# Patient Record
Sex: MALE | Race: WHITE | NOT HISPANIC OR LATINO
[De-identification: names, ages, dates, MRNs, and addresses within clinical notes are randomized per-mention and may not be internally consistent; named-entity substitution may affect disease eponyms.]

---

## 2017-10-11 PROBLEM — Z00.00 ENCOUNTER FOR PREVENTIVE HEALTH EXAMINATION: Status: ACTIVE | Noted: 2017-10-11

## 2017-10-23 ENCOUNTER — APPOINTMENT (OUTPATIENT)
Dept: ORTHOPEDIC SURGERY | Facility: CLINIC | Age: 62
End: 2017-10-23
Payer: COMMERCIAL

## 2017-10-23 VITALS
HEIGHT: 72 IN | WEIGHT: 220 LBS | DIASTOLIC BLOOD PRESSURE: 76 MMHG | BODY MASS INDEX: 29.8 KG/M2 | SYSTOLIC BLOOD PRESSURE: 136 MMHG

## 2017-10-23 DIAGNOSIS — M25.462 EFFUSION, LEFT KNEE: ICD-10-CM

## 2017-10-23 DIAGNOSIS — M25.461 EFFUSION, RIGHT KNEE: ICD-10-CM

## 2017-10-23 DIAGNOSIS — Z78.9 OTHER SPECIFIED HEALTH STATUS: ICD-10-CM

## 2017-10-23 DIAGNOSIS — Z96.659 OTHER MECHANICAL COMPLICATION OF OTHER INTERNAL JOINT PROSTHESIS, INITIAL ENCOUNTER: ICD-10-CM

## 2017-10-23 DIAGNOSIS — Z86.79 PERSONAL HISTORY OF OTHER DISEASES OF THE CIRCULATORY SYSTEM: ICD-10-CM

## 2017-10-23 DIAGNOSIS — M17.10 UNILATERAL PRIMARY OSTEOARTHRITIS, UNSPECIFIED KNEE: ICD-10-CM

## 2017-10-23 DIAGNOSIS — T84.098A OTHER MECHANICAL COMPLICATION OF OTHER INTERNAL JOINT PROSTHESIS, INITIAL ENCOUNTER: ICD-10-CM

## 2017-10-23 PROCEDURE — 20610 DRAIN/INJ JOINT/BURSA W/O US: CPT | Mod: 50

## 2017-10-23 PROCEDURE — 99204 OFFICE O/P NEW MOD 45 MIN: CPT | Mod: 25

## 2017-10-23 PROCEDURE — 73562 X-RAY EXAM OF KNEE 3: CPT | Mod: 50

## 2017-10-23 RX ORDER — LOSARTAN POTASSIUM 50 MG/1
50 TABLET, FILM COATED ORAL
Refills: 0 | Status: ACTIVE | COMMUNITY

## 2017-11-10 ENCOUNTER — RX RENEWAL (OUTPATIENT)
Age: 62
End: 2017-11-10

## 2017-11-20 ENCOUNTER — OUTPATIENT (OUTPATIENT)
Dept: OUTPATIENT SERVICES | Facility: HOSPITAL | Age: 62
LOS: 1 days | End: 2017-11-20
Payer: COMMERCIAL

## 2017-11-20 ENCOUNTER — APPOINTMENT (OUTPATIENT)
Dept: SURGERY | Facility: CLINIC | Age: 62
End: 2017-11-20

## 2017-11-20 VITALS
RESPIRATION RATE: 16 BRPM | DIASTOLIC BLOOD PRESSURE: 97 MMHG | HEIGHT: 72 IN | HEART RATE: 70 BPM | TEMPERATURE: 99 F | SYSTOLIC BLOOD PRESSURE: 153 MMHG | WEIGHT: 227.08 LBS

## 2017-11-20 DIAGNOSIS — T84.032A MECHANICAL LOOSENING OF INTERNAL RIGHT KNEE PROSTHETIC JOINT, INITIAL ENCOUNTER: ICD-10-CM

## 2017-11-20 DIAGNOSIS — I10 ESSENTIAL (PRIMARY) HYPERTENSION: ICD-10-CM

## 2017-11-20 DIAGNOSIS — Z98.890 OTHER SPECIFIED POSTPROCEDURAL STATES: Chronic | ICD-10-CM

## 2017-11-20 DIAGNOSIS — Z01.818 ENCOUNTER FOR OTHER PREPROCEDURAL EXAMINATION: ICD-10-CM

## 2017-11-20 DIAGNOSIS — Z96.651 PRESENCE OF RIGHT ARTIFICIAL KNEE JOINT: Chronic | ICD-10-CM

## 2017-11-20 DIAGNOSIS — T84.098A OTHER MECHANICAL COMPLICATION OF OTHER INTERNAL JOINT PROSTHESIS, INITIAL ENCOUNTER: ICD-10-CM

## 2017-11-20 DIAGNOSIS — F41.1 GENERALIZED ANXIETY DISORDER: ICD-10-CM

## 2017-11-20 DIAGNOSIS — Z96.652 PRESENCE OF LEFT ARTIFICIAL KNEE JOINT: Chronic | ICD-10-CM

## 2017-11-20 LAB
ALBUMIN SERPL ELPH-MCNC: 4.1 G/DL — SIGNIFICANT CHANGE UP (ref 3.3–5)
ALP SERPL-CCNC: 76 U/L — SIGNIFICANT CHANGE UP (ref 40–120)
ALT FLD-CCNC: 24 U/L — SIGNIFICANT CHANGE UP (ref 10–45)
ANION GAP SERPL CALC-SCNC: 16 MMOL/L — SIGNIFICANT CHANGE UP (ref 5–17)
APPEARANCE UR: CLEAR — SIGNIFICANT CHANGE UP
APTT BLD: 33.1 SEC — SIGNIFICANT CHANGE UP (ref 27.5–37.4)
AST SERPL-CCNC: 19 U/L — SIGNIFICANT CHANGE UP (ref 10–40)
BILIRUB SERPL-MCNC: 0.5 MG/DL — SIGNIFICANT CHANGE UP (ref 0.2–1.2)
BILIRUB UR-MCNC: NEGATIVE — SIGNIFICANT CHANGE UP
BUN SERPL-MCNC: 24 MG/DL — HIGH (ref 7–23)
CALCIUM SERPL-MCNC: 9.4 MG/DL — SIGNIFICANT CHANGE UP (ref 8.4–10.5)
CHLORIDE SERPL-SCNC: 98 MMOL/L — SIGNIFICANT CHANGE UP (ref 96–108)
CO2 SERPL-SCNC: 23 MMOL/L — SIGNIFICANT CHANGE UP (ref 22–31)
COLOR SPEC: YELLOW — SIGNIFICANT CHANGE UP
CREAT SERPL-MCNC: 0.98 MG/DL — SIGNIFICANT CHANGE UP (ref 0.5–1.3)
DIFF PNL FLD: NEGATIVE — SIGNIFICANT CHANGE UP
GLUCOSE SERPL-MCNC: 111 MG/DL — HIGH (ref 70–99)
GLUCOSE UR QL: NEGATIVE — SIGNIFICANT CHANGE UP
HCT VFR BLD CALC: 41.1 % — SIGNIFICANT CHANGE UP (ref 39–50)
HGB BLD-MCNC: 14 G/DL — SIGNIFICANT CHANGE UP (ref 13–17)
INR BLD: 0.95 — SIGNIFICANT CHANGE UP (ref 0.88–1.16)
KETONES UR-MCNC: NEGATIVE — SIGNIFICANT CHANGE UP
LEUKOCYTE ESTERASE UR-ACNC: NEGATIVE — SIGNIFICANT CHANGE UP
MCHC RBC-ENTMCNC: 30.1 PG — SIGNIFICANT CHANGE UP (ref 27–34)
MCHC RBC-ENTMCNC: 34.1 G/DL — SIGNIFICANT CHANGE UP (ref 32–36)
MCV RBC AUTO: 88.4 FL — SIGNIFICANT CHANGE UP (ref 80–100)
MRSA PCR RESULT.: NEGATIVE — SIGNIFICANT CHANGE UP
NITRITE UR-MCNC: NEGATIVE — SIGNIFICANT CHANGE UP
PH UR: 6 — SIGNIFICANT CHANGE UP (ref 5–8)
PLATELET # BLD AUTO: 268 K/UL — SIGNIFICANT CHANGE UP (ref 150–400)
POTASSIUM SERPL-MCNC: 4 MMOL/L — SIGNIFICANT CHANGE UP (ref 3.5–5.3)
POTASSIUM SERPL-SCNC: 4 MMOL/L — SIGNIFICANT CHANGE UP (ref 3.5–5.3)
PROT SERPL-MCNC: 7.5 G/DL — SIGNIFICANT CHANGE UP (ref 6–8.3)
PROT UR-MCNC: NEGATIVE MG/DL — SIGNIFICANT CHANGE UP
PROTHROM AB SERPL-ACNC: 10.5 SEC — SIGNIFICANT CHANGE UP (ref 9.8–12.7)
RBC # BLD: 4.65 M/UL — SIGNIFICANT CHANGE UP (ref 4.2–5.8)
RBC # FLD: 13.7 % — SIGNIFICANT CHANGE UP (ref 10.3–16.9)
S AUREUS DNA NOSE QL NAA+PROBE: NEGATIVE — SIGNIFICANT CHANGE UP
SODIUM SERPL-SCNC: 137 MMOL/L — SIGNIFICANT CHANGE UP (ref 135–145)
SP GR SPEC: 1.02 — SIGNIFICANT CHANGE UP (ref 1–1.03)
UROBILINOGEN FLD QL: 0.2 E.U./DL — SIGNIFICANT CHANGE UP
WBC # BLD: 8.5 K/UL — SIGNIFICANT CHANGE UP (ref 3.8–10.5)
WBC # FLD AUTO: 8.5 K/UL — SIGNIFICANT CHANGE UP (ref 3.8–10.5)

## 2017-11-20 PROCEDURE — 71020: CPT | Mod: 26

## 2017-11-20 PROCEDURE — 93010 ELECTROCARDIOGRAM REPORT: CPT | Mod: NC

## 2017-11-20 NOTE — ASU PATIENT PROFILE, ADULT - PSH
H/O total knee replacement, left  2010  H/O total knee replacement, right  2009  History of surgery  left knee revision of total knee replacement (2013)  History of surgery  right knee total knee replacement revision (2015)  History of surgery  left leg fracture repair with hardware (2011)

## 2017-11-20 NOTE — H&P PST ADULT - HISTORY OF PRESENT ILLNESS
62 year old male with osteoarthritis right knee s/p right TKR 62 year old white male with osteoarthritis right knee s/p right TKR about 2013 and revision was done 2015.  About one year ago started to have moderate pain, deformity, decreased ROM and unsteady gait when started approaching normal activity.  Symptoms worse with stairs and after prolonged imobility and persist over time.   Left TKR also done about 2011 and revision 2 years later.  Sonogram of both legs less than two weeks ago negative for DVT although D dimer elevated.

## 2017-11-20 NOTE — H&P PST ADULT - FAMILY HISTORY
Father  Still living? No  Family history of hypertension in father, Age at diagnosis: Age Unknown  Family history of coronary artery disease in father, Age at diagnosis: Age Unknown  Family history of diabetes mellitus in father, Age at diagnosis: Age Unknown

## 2017-11-21 LAB
CULTURE RESULTS: SIGNIFICANT CHANGE UP
SPECIMEN SOURCE: SIGNIFICANT CHANGE UP

## 2017-12-03 RX ORDER — SENNA PLUS 8.6 MG/1
2 TABLET ORAL AT BEDTIME
Qty: 0 | Refills: 0 | Status: DISCONTINUED | OUTPATIENT
Start: 2017-12-05 | End: 2017-12-07

## 2017-12-03 RX ORDER — LOSARTAN POTASSIUM 100 MG/1
50 TABLET, FILM COATED ORAL DAILY
Qty: 0 | Refills: 0 | Status: DISCONTINUED | OUTPATIENT
Start: 2017-12-05 | End: 2017-12-07

## 2017-12-03 RX ORDER — OXYCODONE HYDROCHLORIDE 5 MG/1
5 TABLET ORAL EVERY 4 HOURS
Qty: 0 | Refills: 0 | Status: DISCONTINUED | OUTPATIENT
Start: 2017-12-05 | End: 2017-12-07

## 2017-12-03 RX ORDER — OXYCODONE HYDROCHLORIDE 5 MG/1
10 TABLET ORAL EVERY 4 HOURS
Qty: 0 | Refills: 0 | Status: DISCONTINUED | OUTPATIENT
Start: 2017-12-05 | End: 2017-12-07

## 2017-12-03 RX ORDER — POLYETHYLENE GLYCOL 3350 17 G/17G
17 POWDER, FOR SOLUTION ORAL DAILY
Qty: 0 | Refills: 0 | Status: DISCONTINUED | OUTPATIENT
Start: 2017-12-05 | End: 2017-12-07

## 2017-12-03 RX ORDER — HYDROMORPHONE HYDROCHLORIDE 2 MG/ML
0.5 INJECTION INTRAMUSCULAR; INTRAVENOUS; SUBCUTANEOUS EVERY 4 HOURS
Qty: 0 | Refills: 0 | Status: DISCONTINUED | OUTPATIENT
Start: 2017-12-05 | End: 2017-12-07

## 2017-12-03 RX ORDER — SODIUM CHLORIDE 9 MG/ML
1000 INJECTION, SOLUTION INTRAVENOUS
Qty: 0 | Refills: 0 | Status: DISCONTINUED | OUTPATIENT
Start: 2017-12-05 | End: 2017-12-07

## 2017-12-03 RX ORDER — ESCITALOPRAM OXALATE 10 MG/1
10 TABLET, FILM COATED ORAL DAILY
Qty: 0 | Refills: 0 | Status: DISCONTINUED | OUTPATIENT
Start: 2017-12-05 | End: 2017-12-07

## 2017-12-03 RX ORDER — DOCUSATE SODIUM 100 MG
100 CAPSULE ORAL THREE TIMES A DAY
Qty: 0 | Refills: 0 | Status: DISCONTINUED | OUTPATIENT
Start: 2017-12-05 | End: 2017-12-07

## 2017-12-03 RX ORDER — ACETAMINOPHEN 500 MG
650 TABLET ORAL EVERY 6 HOURS
Qty: 0 | Refills: 0 | Status: DISCONTINUED | OUTPATIENT
Start: 2017-12-05 | End: 2017-12-07

## 2017-12-03 RX ORDER — ENOXAPARIN SODIUM 100 MG/ML
40 INJECTION SUBCUTANEOUS EVERY 24 HOURS
Qty: 0 | Refills: 0 | Status: DISCONTINUED | OUTPATIENT
Start: 2017-12-06 | End: 2017-12-07

## 2017-12-03 RX ORDER — MAGNESIUM HYDROXIDE 400 MG/1
30 TABLET, CHEWABLE ORAL DAILY
Qty: 0 | Refills: 0 | Status: DISCONTINUED | OUTPATIENT
Start: 2017-12-05 | End: 2017-12-07

## 2017-12-03 RX ORDER — ONDANSETRON 8 MG/1
4 TABLET, FILM COATED ORAL EVERY 6 HOURS
Qty: 0 | Refills: 0 | Status: DISCONTINUED | OUTPATIENT
Start: 2017-12-05 | End: 2017-12-07

## 2017-12-03 NOTE — H&P ADULT - HISTORY OF PRESENT ILLNESS
62 y.o. M. with h/o HTN and failed Right TKR Revision don on 9/2015 by Dr. Mayer. Patient has pain, instabilty and swelling that are getting worse and is presenting at [facility] for his another Right TKR Revision with Dr. Davila.

## 2017-12-03 NOTE — H&P ADULT - REASON FOR ADMISSION
Right knee symptoms that are not getting better S/P Right TKR Revision done on 9/2015 by Dr. VANESSA Mayer.

## 2017-12-03 NOTE — H&P ADULT - NSHPPHYSICALEXAM_GEN_ALL_CORE
Right Knee healed midline incision, mild effusion, medial joint tenderness, ROM 0-120 degrees, increased AP translation and laxity.

## 2017-12-03 NOTE — H&P ADULT - NSHPLABSRESULTS_GEN_ALL_CORE
Right Knee x-ray shows Right TKR Revision with stem and medially augmented Tibia component and diffuse radio lucent lines, significant tibial bone loss under the wedge, radiolucent lines adjacent to anterior and posterior femoral component, radio lucency adjacent to the patella component suggestive of  possible loosening.

## 2017-12-04 ENCOUNTER — FORM ENCOUNTER (OUTPATIENT)
Age: 62
End: 2017-12-04

## 2017-12-05 ENCOUNTER — RESULT REVIEW (OUTPATIENT)
Age: 62
End: 2017-12-05

## 2017-12-05 ENCOUNTER — APPOINTMENT (OUTPATIENT)
Dept: ORTHOPEDIC SURGERY | Facility: HOSPITAL | Age: 62
End: 2017-12-05

## 2017-12-05 ENCOUNTER — INPATIENT (INPATIENT)
Facility: HOSPITAL | Age: 62
LOS: 1 days | Discharge: HOME | DRG: 468 | End: 2017-12-07
Attending: ORTHOPAEDIC SURGERY | Admitting: ORTHOPAEDIC SURGERY
Payer: COMMERCIAL

## 2017-12-05 ENCOUNTER — TRANSCRIPTION ENCOUNTER (OUTPATIENT)
Age: 62
End: 2017-12-05

## 2017-12-05 VITALS
HEIGHT: 72 IN | TEMPERATURE: 98 F | SYSTOLIC BLOOD PRESSURE: 162 MMHG | OXYGEN SATURATION: 96 % | DIASTOLIC BLOOD PRESSURE: 79 MMHG | WEIGHT: 227.96 LBS | HEART RATE: 62 BPM | RESPIRATION RATE: 20 BRPM

## 2017-12-05 DIAGNOSIS — F41.9 ANXIETY DISORDER, UNSPECIFIED: ICD-10-CM

## 2017-12-05 DIAGNOSIS — Z96.651 PRESENCE OF RIGHT ARTIFICIAL KNEE JOINT: ICD-10-CM

## 2017-12-05 DIAGNOSIS — Z98.890 OTHER SPECIFIED POSTPROCEDURAL STATES: Chronic | ICD-10-CM

## 2017-12-05 DIAGNOSIS — Z96.652 PRESENCE OF LEFT ARTIFICIAL KNEE JOINT: Chronic | ICD-10-CM

## 2017-12-05 DIAGNOSIS — I10 ESSENTIAL (PRIMARY) HYPERTENSION: ICD-10-CM

## 2017-12-05 DIAGNOSIS — Z96.651 PRESENCE OF RIGHT ARTIFICIAL KNEE JOINT: Chronic | ICD-10-CM

## 2017-12-05 LAB
ANION GAP SERPL CALC-SCNC: 11 MMOL/L — SIGNIFICANT CHANGE UP (ref 5–17)
BUN SERPL-MCNC: 20 MG/DL — SIGNIFICANT CHANGE UP (ref 7–23)
CALCIUM SERPL-MCNC: 8.3 MG/DL — LOW (ref 8.4–10.5)
CHLORIDE SERPL-SCNC: 104 MMOL/L — SIGNIFICANT CHANGE UP (ref 96–108)
CO2 SERPL-SCNC: 24 MMOL/L — SIGNIFICANT CHANGE UP (ref 22–31)
CREAT SERPL-MCNC: 1.05 MG/DL — SIGNIFICANT CHANGE UP (ref 0.5–1.3)
GLUCOSE SERPL-MCNC: 150 MG/DL — HIGH (ref 70–99)
HCT VFR BLD CALC: 37.5 % — LOW (ref 39–50)
HGB BLD-MCNC: 12.1 G/DL — LOW (ref 13–17)
MCHC RBC-ENTMCNC: 29.1 PG — SIGNIFICANT CHANGE UP (ref 27–34)
MCHC RBC-ENTMCNC: 32.3 G/DL — SIGNIFICANT CHANGE UP (ref 32–36)
MCV RBC AUTO: 90.1 FL — SIGNIFICANT CHANGE UP (ref 80–100)
PLATELET # BLD AUTO: 236 K/UL — SIGNIFICANT CHANGE UP (ref 150–400)
POTASSIUM SERPL-MCNC: 4 MMOL/L — SIGNIFICANT CHANGE UP (ref 3.5–5.3)
POTASSIUM SERPL-SCNC: 4 MMOL/L — SIGNIFICANT CHANGE UP (ref 3.5–5.3)
RBC # BLD: 4.16 M/UL — LOW (ref 4.2–5.8)
RBC # FLD: 13.3 % — SIGNIFICANT CHANGE UP (ref 10.3–16.9)
SODIUM SERPL-SCNC: 139 MMOL/L — SIGNIFICANT CHANGE UP (ref 135–145)
WBC # BLD: 10.9 K/UL — HIGH (ref 3.8–10.5)
WBC # FLD AUTO: 10.9 K/UL — HIGH (ref 3.8–10.5)

## 2017-12-05 PROCEDURE — 73560 X-RAY EXAM OF KNEE 1 OR 2: CPT | Mod: 26,RT

## 2017-12-05 PROCEDURE — 27487 REVISE/REPLACE KNEE JOINT: CPT | Mod: RT

## 2017-12-05 PROCEDURE — 27415 OSTEOCHONDRAL KNEE ALLOGRAFT: CPT | Mod: 52

## 2017-12-05 RX ORDER — VANCOMYCIN HCL 1 G
1000 VIAL (EA) INTRAVENOUS ONCE
Qty: 0 | Refills: 0 | Status: DISCONTINUED | OUTPATIENT
Start: 2017-12-06 | End: 2017-12-06

## 2017-12-05 RX ORDER — OXYCODONE HYDROCHLORIDE 5 MG/1
10 TABLET ORAL EVERY 12 HOURS
Qty: 0 | Refills: 0 | Status: DISCONTINUED | OUTPATIENT
Start: 2017-12-05 | End: 2017-12-06

## 2017-12-05 RX ORDER — BUPIVACAINE 13.3 MG/ML
20 INJECTION, SUSPENSION, LIPOSOMAL INFILTRATION ONCE
Qty: 0 | Refills: 0 | Status: DISCONTINUED | OUTPATIENT
Start: 2017-12-05 | End: 2017-12-07

## 2017-12-05 RX ADMIN — HYDROMORPHONE HYDROCHLORIDE 0.5 MILLIGRAM(S): 2 INJECTION INTRAMUSCULAR; INTRAVENOUS; SUBCUTANEOUS at 21:15

## 2017-12-05 RX ADMIN — OXYCODONE HYDROCHLORIDE 10 MILLIGRAM(S): 5 TABLET ORAL at 23:40

## 2017-12-05 RX ADMIN — OXYCODONE HYDROCHLORIDE 10 MILLIGRAM(S): 5 TABLET ORAL at 22:45

## 2017-12-05 RX ADMIN — HYDROMORPHONE HYDROCHLORIDE 0.5 MILLIGRAM(S): 2 INJECTION INTRAMUSCULAR; INTRAVENOUS; SUBCUTANEOUS at 21:30

## 2017-12-05 RX ADMIN — OXYCODONE HYDROCHLORIDE 10 MILLIGRAM(S): 5 TABLET ORAL at 23:48

## 2017-12-05 RX ADMIN — Medication 100 MILLIGRAM(S): at 22:45

## 2017-12-05 NOTE — BRIEF OPERATIVE NOTE - PROCEDURE
<<-----Click on this checkbox to enter Procedure Major revision of total knee replacement  12/05/2017  right  Active  CHELSIE

## 2017-12-05 NOTE — DISCHARGE NOTE ADULT - REASON FOR ADMISSION
Right knee symptoms that are not getting better S/P Right TKR Revision done on 9/2015 by Dr. VANESSA Mayer. Right knee symptoms that are not getting better S/P Right TKR Revision done on 9/2015 by Dr. VANESSA Mayer/ right total knee replacement 12/5/17

## 2017-12-05 NOTE — DISCHARGE NOTE ADULT - MEDICATION SUMMARY - MEDICATIONS TO TAKE
I will START or STAY ON the medications listed below when I get home from the hospital:    oxyCODONE-acetaminophen 10 mg-325 mg oral tablet  -- 1 to 1 1/2  tab(s) by mouth every 4 to 6 hours, as needed, pain MDD:8  -- Caution federal law prohibits the transfer of this drug to any person other  than the person for whom it was prescribed.  May cause drowsiness.  Alcohol may intensify this effect.  Use care when operating dangerous machinery.  This prescription cannot be refilled.  This product contains acetaminophen.  Do not use  with any other product containing acetaminophen to prevent possible liver damage.  Using more of this medication than prescribed may cause serious breathing problems.    -- Indication: For moderate to severe pain    MS Contin 15 mg oral tablet, extended release  -- 1 tab(s) by mouth every 12 hours MDD:2  -- Indication: For longacting pain control    Ecotrin 325 mg oral delayed release tablet  -- 1 tab(s) by mouth 2 times a day. Start after completion of enoxaparin injections  -- Swallow whole.  Do not crush.  Take with food or milk.    -- Indication: For prevent blood clots. Start after completion of enoxaparin injections    CeleBREX 200 mg oral capsule  -- 1 cap(s) by mouth 2 times a day . Take with food  -- Do not take this drug if you are pregnant.  Medication should be taken with plenty of water.  Obtain medical advice before taking any non-prescription drugs as some may affect the action of this medication.  Take with food or milk.    -- Indication: For Antiinflammatory/pain control    losartan 50 mg oral tablet  -- 1 tab(s) by mouth once a day  -- Indication: For blood pressure control    enoxaparin 40 mg/0.4 mL injectable solution  -- 40 milligram(s) injectable once a day   -- It is very important that you take or use this exactly as directed.  Do not skip doses or discontinue unless directed by your doctor.    -- Indication: For prevent blood clots    Lyrica 25 mg oral capsule  -- 1 cap(s) by mouth every 8 hours MDD:3  -- Check with your doctor before becoming pregnant.  Do not drink alcoholic beverages when taking this medication.  May cause drowsiness or dizziness.  This drug may impair the ability to drive or operate machinery.  Use care until you become familiar with its effects.    -- Indication: For nerve pain    escitalopram 10 mg oral tablet  -- 1 tab(s) by mouth once a day  -- Indication: For Home antidepressant    bisacodyl 10 mg rectal suppository  -- 1 suppository(ies) rectally once a day, As needed, If no bowel movement by postoperative day #2  -- Indication: For constipation    docusate sodium 100 mg oral capsule  -- 1 cap(s) by mouth 3 times a day  -- Indication: For constipation    polyethylene glycol 3350 oral powder for reconstitution  -- 17 gram(s) by mouth once a day  -- Indication: For constipation    senna oral tablet  -- 2 tab(s) by mouth once a day (at bedtime), As needed, Constipation  -- Indication: For constipation

## 2017-12-05 NOTE — DISCHARGE NOTE ADULT - PATIENT PORTAL LINK FT
“You can access the [employer] Patient Portal, offered by [facility], by registering with the following website: [email or web address]”

## 2017-12-05 NOTE — DISCHARGE NOTE ADULT - CARE PROVIDER_API CALL
Feroz Davila (MD), Orthopaedic Surgery  [address]  [address] [place] [address]  Phone: [phone]  Fax: [phone]

## 2017-12-05 NOTE — DISCHARGE NOTE ADULT - NS AS ACTIVITY OBS
Showering allowed/No Heavy lifting/straining/Do not drive or operate machinery/Do not make important decisions

## 2017-12-05 NOTE — PROGRESS NOTE ADULT - SUBJECTIVE AND OBJECTIVE BOX
Fellow Note   Patient seen and examined at bedside.      S: No acute events post op.  Pain well controlled with spinal/epidural   Denies CP/SOB. Tolerating PO.  ROS unremarkable.    O: T(C): 36.4 (12-05-17 @ 16:55), Max: 36.7 (12-05-17 @ 12:34)  HR: 74 (12-05-17 @ 17:29) (62 - 78)  BP: 134/74 (12-05-17 @ 17:29) (125/67 - 162/79)  RR: 14 (12-05-17 @ 17:29) (11 - 20)  SpO2: 99% (12-05-17 @ 17:29) (96% - 99%)  Wt(kg): --    NAD, AOx3, non-labored respirations  RLE: dressing CDI, hemovac x 1   Extremity soft, appropriate swelling and minimally TTP  foot warm and well perfused  sensation and motor decreased 2/2 spinal/epidural;  +flex/ext toes minimally                           12.1   10.9  )-----------( 236      ( 05 Dec 2017 17:11 )             37.5       I&O's Detail        A/P: 62y Male s/p revision R TKA     - analgesia with bowel regimen  - WBAT with PT  - OOB ad lb  - DVT ppx: Lovenox  - ADAT  -vanco x 24hrs   - f/u labs   - f/u OR cultures   - Dispo planning- pending

## 2017-12-05 NOTE — DISCHARGE NOTE ADULT - HOSPITAL COURSE
Admitted  Surgery  Kacey-op Antibiotics  Pain control  DVT prophylaxis  OOB/Physical Therapy Admitted 12/5/17  Surgery 12/5/17- R TKA  Kacey-op Antibiotics  Pain control  DVT prophylaxis  OOB/Physical Therapy

## 2017-12-05 NOTE — DISCHARGE NOTE ADULT - MEDICATION SUMMARY - MEDICATIONS TO CHANGE
I will SWITCH the dose or number of times a day I take the medications listed below when I get home from the hospital:    oxyCODONE 5 mg oral tablet  -- two tabs q4hrs    Tylenol  -- 1500mg once daily    Aleve 220 mg oral tablet  -- qam

## 2017-12-05 NOTE — DISCHARGE NOTE ADULT - ADDITIONAL INSTRUCTIONS
No strenuous activity, heavy lifting, driving or returning to work until cleared by MD.  You may shower - dressing is water-resistant, no soaking in bathtubs.  Remove dressing after post op day 5-7, then leave incision open to air. Keep incision clean and dry.  Try to have regular bowel movements, take stool softener or laxative if necessary.  May take Pepcid or Zantac for upset stomach.  May take Aleve or Naproxen instead of Meloxicam.  Swelling may travel all the way down leg to foot, this is normal and will subside in a few weeks.  Call to schedule an appt with Dr. Davila for follow up, if you have staples or sutures they will be removed in office.  Contact your doctor if you experience: fever greater than 101.5, chills, chest pain, difficulty breathing, redness or excessive drainage around the incision, other concerns.     Follow up with your primary care provider. Weight bearing as tolerated on right leg with rolling walker.  No strenuous activity, heavy lifting, driving or returning to work until cleared by MD.  You may shower. Katie wrap around knee while showering. No soaking in bathtubs.  Remove dressing after post op day 7, then leave incision open to air. Keep incision clean and dry.  Try to have regular bowel movements, take stool softener or laxative if necessary.  May take Pepcid or Zantac for upset stomach.  Swelling may travel all the way down leg to foot, this is normal and will subside in a few weeks.  Call to schedule an appt with Dr. Davila for follow up, if you have staples or sutures they will be removed in office.  Contact your doctor if you experience: fever greater than 101.5, chills, chest pain, difficulty breathing, redness or excessive drainage around the incision, other concerns.     Follow up with your primary care provider.

## 2017-12-05 NOTE — DISCHARGE NOTE ADULT - CARE PLAN
Principal Discharge DX:	S/P revision of total knee, right  Goal:	Improvement after surgery.  Instructions for follow-up, activity and diet:	See below. Principal Discharge DX:	S/P revision of total knee, right  Goal:	Improvement after surgery  Instructions for follow-up, activity and diet:	See below.

## 2017-12-06 LAB
ANION GAP SERPL CALC-SCNC: 14 MMOL/L — SIGNIFICANT CHANGE UP (ref 5–17)
BUN SERPL-MCNC: 17 MG/DL — SIGNIFICANT CHANGE UP (ref 7–23)
CALCIUM SERPL-MCNC: 8.6 MG/DL — SIGNIFICANT CHANGE UP (ref 8.4–10.5)
CHLORIDE SERPL-SCNC: 100 MMOL/L — SIGNIFICANT CHANGE UP (ref 96–108)
CO2 SERPL-SCNC: 23 MMOL/L — SIGNIFICANT CHANGE UP (ref 22–31)
CREAT SERPL-MCNC: 0.89 MG/DL — SIGNIFICANT CHANGE UP (ref 0.5–1.3)
GLUCOSE SERPL-MCNC: 126 MG/DL — HIGH (ref 70–99)
GRAM STN FLD: SIGNIFICANT CHANGE UP
HCT VFR BLD CALC: 38.8 % — LOW (ref 39–50)
HGB BLD-MCNC: 12.9 G/DL — LOW (ref 13–17)
MCHC RBC-ENTMCNC: 29.5 PG — SIGNIFICANT CHANGE UP (ref 27–34)
MCHC RBC-ENTMCNC: 33.2 G/DL — SIGNIFICANT CHANGE UP (ref 32–36)
MCV RBC AUTO: 88.8 FL — SIGNIFICANT CHANGE UP (ref 80–100)
PLATELET # BLD AUTO: 269 K/UL — SIGNIFICANT CHANGE UP (ref 150–400)
POTASSIUM SERPL-MCNC: 4.2 MMOL/L — SIGNIFICANT CHANGE UP (ref 3.5–5.3)
POTASSIUM SERPL-SCNC: 4.2 MMOL/L — SIGNIFICANT CHANGE UP (ref 3.5–5.3)
RBC # BLD: 4.37 M/UL — SIGNIFICANT CHANGE UP (ref 4.2–5.8)
RBC # FLD: 13 % — SIGNIFICANT CHANGE UP (ref 10.3–16.9)
SODIUM SERPL-SCNC: 137 MMOL/L — SIGNIFICANT CHANGE UP (ref 135–145)
SPECIMEN SOURCE: SIGNIFICANT CHANGE UP
WBC # BLD: 16.7 K/UL — HIGH (ref 3.8–10.5)
WBC # FLD AUTO: 16.7 K/UL — HIGH (ref 3.8–10.5)

## 2017-12-06 RX ORDER — VANCOMYCIN HCL 1 G
1500 VIAL (EA) INTRAVENOUS ONCE
Qty: 0 | Refills: 0 | Status: COMPLETED | OUTPATIENT
Start: 2017-12-06 | End: 2017-12-06

## 2017-12-06 RX ORDER — KETOROLAC TROMETHAMINE 30 MG/ML
15 SYRINGE (ML) INJECTION ONCE
Qty: 0 | Refills: 0 | Status: DISCONTINUED | OUTPATIENT
Start: 2017-12-06 | End: 2017-12-06

## 2017-12-06 RX ORDER — OXYCODONE HYDROCHLORIDE 5 MG/1
20 TABLET ORAL EVERY 12 HOURS
Qty: 0 | Refills: 0 | Status: DISCONTINUED | OUTPATIENT
Start: 2017-12-06 | End: 2017-12-07

## 2017-12-06 RX ADMIN — POLYETHYLENE GLYCOL 3350 17 GRAM(S): 17 POWDER, FOR SOLUTION ORAL at 13:55

## 2017-12-06 RX ADMIN — HYDROMORPHONE HYDROCHLORIDE 0.5 MILLIGRAM(S): 2 INJECTION INTRAMUSCULAR; INTRAVENOUS; SUBCUTANEOUS at 15:00

## 2017-12-06 RX ADMIN — ENOXAPARIN SODIUM 40 MILLIGRAM(S): 100 INJECTION SUBCUTANEOUS at 05:22

## 2017-12-06 RX ADMIN — HYDROMORPHONE HYDROCHLORIDE 0.5 MILLIGRAM(S): 2 INJECTION INTRAMUSCULAR; INTRAVENOUS; SUBCUTANEOUS at 09:46

## 2017-12-06 RX ADMIN — Medication 300 MILLIGRAM(S): at 01:26

## 2017-12-06 RX ADMIN — Medication 15 MILLIGRAM(S): at 18:06

## 2017-12-06 RX ADMIN — OXYCODONE HYDROCHLORIDE 10 MILLIGRAM(S): 5 TABLET ORAL at 00:40

## 2017-12-06 RX ADMIN — Medication 100 MILLIGRAM(S): at 21:01

## 2017-12-06 RX ADMIN — HYDROMORPHONE HYDROCHLORIDE 0.5 MILLIGRAM(S): 2 INJECTION INTRAMUSCULAR; INTRAVENOUS; SUBCUTANEOUS at 05:41

## 2017-12-06 RX ADMIN — OXYCODONE HYDROCHLORIDE 10 MILLIGRAM(S): 5 TABLET ORAL at 22:00

## 2017-12-06 RX ADMIN — Medication 15 MILLIGRAM(S): at 18:40

## 2017-12-06 RX ADMIN — Medication 100 MILLIGRAM(S): at 05:22

## 2017-12-06 RX ADMIN — OXYCODONE HYDROCHLORIDE 10 MILLIGRAM(S): 5 TABLET ORAL at 16:43

## 2017-12-06 RX ADMIN — HYDROMORPHONE HYDROCHLORIDE 0.5 MILLIGRAM(S): 2 INJECTION INTRAMUSCULAR; INTRAVENOUS; SUBCUTANEOUS at 05:26

## 2017-12-06 RX ADMIN — OXYCODONE HYDROCHLORIDE 10 MILLIGRAM(S): 5 TABLET ORAL at 21:00

## 2017-12-06 RX ADMIN — OXYCODONE HYDROCHLORIDE 20 MILLIGRAM(S): 5 TABLET ORAL at 23:00

## 2017-12-06 RX ADMIN — OXYCODONE HYDROCHLORIDE 20 MILLIGRAM(S): 5 TABLET ORAL at 22:19

## 2017-12-06 RX ADMIN — SENNA PLUS 2 TABLET(S): 8.6 TABLET ORAL at 21:01

## 2017-12-06 RX ADMIN — OXYCODONE HYDROCHLORIDE 10 MILLIGRAM(S): 5 TABLET ORAL at 17:30

## 2017-12-06 RX ADMIN — OXYCODONE HYDROCHLORIDE 10 MILLIGRAM(S): 5 TABLET ORAL at 07:44

## 2017-12-06 RX ADMIN — HYDROMORPHONE HYDROCHLORIDE 0.5 MILLIGRAM(S): 2 INJECTION INTRAMUSCULAR; INTRAVENOUS; SUBCUTANEOUS at 14:07

## 2017-12-06 RX ADMIN — OXYCODONE HYDROCHLORIDE 10 MILLIGRAM(S): 5 TABLET ORAL at 08:17

## 2017-12-06 RX ADMIN — LOSARTAN POTASSIUM 50 MILLIGRAM(S): 100 TABLET, FILM COATED ORAL at 05:22

## 2017-12-06 RX ADMIN — ESCITALOPRAM OXALATE 10 MILLIGRAM(S): 10 TABLET, FILM COATED ORAL at 13:55

## 2017-12-06 RX ADMIN — OXYCODONE HYDROCHLORIDE 10 MILLIGRAM(S): 5 TABLET ORAL at 12:01

## 2017-12-06 RX ADMIN — OXYCODONE HYDROCHLORIDE 20 MILLIGRAM(S): 5 TABLET ORAL at 10:19

## 2017-12-06 RX ADMIN — HYDROMORPHONE HYDROCHLORIDE 0.5 MILLIGRAM(S): 2 INJECTION INTRAMUSCULAR; INTRAVENOUS; SUBCUTANEOUS at 01:41

## 2017-12-06 RX ADMIN — OXYCODONE HYDROCHLORIDE 10 MILLIGRAM(S): 5 TABLET ORAL at 13:00

## 2017-12-06 RX ADMIN — HYDROMORPHONE HYDROCHLORIDE 0.5 MILLIGRAM(S): 2 INJECTION INTRAMUSCULAR; INTRAVENOUS; SUBCUTANEOUS at 10:15

## 2017-12-06 RX ADMIN — OXYCODONE HYDROCHLORIDE 20 MILLIGRAM(S): 5 TABLET ORAL at 11:19

## 2017-12-06 RX ADMIN — Medication 15 MILLIGRAM(S): at 09:30

## 2017-12-06 RX ADMIN — Medication 100 MILLIGRAM(S): at 13:55

## 2017-12-06 RX ADMIN — HYDROMORPHONE HYDROCHLORIDE 0.5 MILLIGRAM(S): 2 INJECTION INTRAMUSCULAR; INTRAVENOUS; SUBCUTANEOUS at 01:26

## 2017-12-06 RX ADMIN — Medication 15 MILLIGRAM(S): at 08:59

## 2017-12-06 NOTE — PHYSICAL THERAPY INITIAL EVALUATION ADULT - DIAGNOSIS, PT EVAL
4H: Impaired joint mob, motor function, muscle performance, and ROM associated w/ joint arthroplasty

## 2017-12-06 NOTE — PHYSICAL THERAPY INITIAL EVALUATION ADULT - GAIT DEVIATIONS NOTED, PT EVAL
decreased step length/decreased weight-shifting ability/increased time in double stance/decreased arabella

## 2017-12-06 NOTE — PROGRESS NOTE ADULT - SUBJECTIVE AND OBJECTIVE BOX
ORTHO NOTE    [x] Pt seen/examined.  [x] Pt without any complaints/in NAD. Pain is well controlled. Pt seen this morning by Fellow.     [ ] Pt complains of:      ROS: [ ] Fever  [ ] Chills  [ ] CP [ ] SOB [ ] Dysnea  [ ] Palpitations [ ] Cough [ ] N/V/C/D [ ] Paresthia [ ] Other     [x] ROS  otherwise negative    .    PHYSICAL EXAM:  Dressing C/D/I, hemovac drain in place   Pt sitting comfortably in a chair   EHL/FHL/TA/GS 5/5 motor strength bilateral lower extremities      Vital Signs Last 24 Hrs  T(C): 36.9 (06 Dec 2017 09:14), Max: 36.9 (06 Dec 2017 09:14)  T(F): 98.4 (06 Dec 2017 09:14), Max: 98.4 (06 Dec 2017 09:14)  HR: 74 (06 Dec 2017 09:14) (62 - 94)  BP: 148/73 (06 Dec 2017 09:14) (125/67 - 162/79)  BP(mean): 105 (05 Dec 2017 18:20) (88 - 105)  RR: 16 (06 Dec 2017 09:14) (11 - 78)  SpO2: 97% (06 Dec 2017 09:14) (94% - 100%)    I&O's Detail    05 Dec 2017 07:01  -  06 Dec 2017 07:00  --------------------------------------------------------  IN:    IV PiggyBack: 500 mL    lactated ringers.: 420 mL    Oral Fluid: 720 mL  Total IN: 1640 mL    OUT:    Accordian: 100 mL    Indwelling Catheter - Urethral: 2150 mL  Total OUT: 2250 mL    Total NET: -610 mL      06 Dec 2017 07:01  -  06 Dec 2017 12:09  --------------------------------------------------------  IN:    Oral Fluid: 360 mL  Total IN: 360 mL    OUT:    Accordian: 250 mL    Voided: 350 mL  Total OUT: 600 mL    Total NET: -240 mL      LABS                        12.9   16.7  )-----------( 269      ( 06 Dec 2017 07:06 )             38.8                                12-06    137  |  100  |  17  ----------------------------<  126<H>  4.2   |  23  |  0.89    Ca    8.6      06 Dec 2017 07:06        [ ] Other Labs  [ ] None ordered        ASSESSMENT/PLAN:  62M POD #1    STATUS POST: Right revision TKR, aseptic looseing     CONTINUE:          [x] PT    [x] DVT PPX- Lovenox    [x] Pain Mgt    [x] Dispo plan- home    [x] OR cultures - NGTD    [x] Drain management per Fellow following

## 2017-12-06 NOTE — PHYSICAL THERAPY INITIAL EVALUATION ADULT - ADDITIONAL COMMENTS
Pt lives in 1 level private home w/ no stairs to enter. States that he was not using any DME prior to this admission, no hx of recent falls, no HHA.    R knee AAROM: 5-80

## 2017-12-06 NOTE — PROGRESS NOTE ADULT - SUBJECTIVE AND OBJECTIVE BOX
POST OPERATIVE DAY #:  1  STATUS POST: TKA revision                      SUBJECTIVE: Patient seen and examined. Pain poorly controlled overnight. Drain output 80 over shift    OBJECTIVE:     Vital Signs Last 24 Hrs  T(C): 36.6 (06 Dec 2017 05:20), Max: 36.8 (05 Dec 2017 22:02)  T(F): 97.8 (06 Dec 2017 05:20), Max: 98.3 (05 Dec 2017 22:02)  HR: 90 (06 Dec 2017 05:20) (62 - 94)  BP: 149/79 (06 Dec 2017 05:20) (125/67 - 162/79)  BP(mean): 105 (05 Dec 2017 18:20) (88 - 105)  RR: 17 (06 Dec 2017 05:20) (11 - 78)  SpO2: 98% (06 Dec 2017 05:20) (94% - 100%)    Affected extremity:          Dressing: clean/dry/intact :           Sensation: intact to light touch:          Motor exam: 5/ 5 Tibialis Anterior/Gastrocnemius-Soleus          warm well perfused; capillary refill <3 seconds     LABS:                        12.9   16.7  )-----------( 269      ( 06 Dec 2017 07:06 )             38.8     12-05    139  |  104  |  20  ----------------------------<  150<H>  4.0   |  24  |  1.05    Ca    8.3<L>      05 Dec 2017 17:11            MEDICATIONS:acetaminophen   Tablet 650 milliGRAM(s) Oral every 6 hours PRN  aluminum hydroxide/magnesium hydroxide/simethicone Suspension 30 milliLiter(s) Oral four times a day PRN  BUpivacaine liposome 1.3% Injectable (no eMAR) 20 milliLiter(s) Local Injection once  docusate sodium 100 milliGRAM(s) Oral three times a day  enoxaparin Injectable 40 milliGRAM(s) SubCutaneous every 24 hours  escitalopram 10 milliGRAM(s) Oral daily  HYDROmorphone  Injectable 0.5 milliGRAM(s) IV Push every 4 hours PRN  lactated ringers. 1000 milliLiter(s) IV Continuous <Continuous>  losartan 50 milliGRAM(s) Oral daily  magnesium hydroxide Suspension 30 milliLiter(s) Oral daily PRN  ondansetron Injectable 4 milliGRAM(s) IV Push every 6 hours PRN  oxyCODONE    IR 5 milliGRAM(s) Oral every 4 hours PRN  oxyCODONE    IR 10 milliGRAM(s) Oral every 4 hours PRN  oxyCODONE  ER Tablet 10 milliGRAM(s) Oral every 12 hours  polyethylene glycol 3350 17 Gram(s) Oral daily  senna 2 Tablet(s) Oral at bedtime PRN    Anticoagulation:  enoxaparin Injectable 40 milliGRAM(s) SubCutaneous every 24 hours      Antibiotics:       Pain medications:   acetaminophen   Tablet 650 milliGRAM(s) Oral every 6 hours PRN  escitalopram 10 milliGRAM(s) Oral daily  HYDROmorphone  Injectable 0.5 milliGRAM(s) IV Push every 4 hours PRN  ondansetron Injectable 4 milliGRAM(s) IV Push every 6 hours PRN  oxyCODONE    IR 5 milliGRAM(s) Oral every 4 hours PRN  oxyCODONE    IR 10 milliGRAM(s) Oral every 4 hours PRN  oxyCODONE  ER Tablet 10 milliGRAM(s) Oral every 12 hours      61yo M s/p R Revision TKA,  aseptic loosening   - WBAT  - Hemovac x 1 drain: 80 cc's over shift  - Vanco x 24hrs   - Lovenox   - hx of chronic pain meds will consults pain management   - f/u labs  - f/u OR  cultures

## 2017-12-07 VITALS
HEART RATE: 65 BPM | RESPIRATION RATE: 16 BRPM | OXYGEN SATURATION: 98 % | SYSTOLIC BLOOD PRESSURE: 162 MMHG | DIASTOLIC BLOOD PRESSURE: 83 MMHG | TEMPERATURE: 98 F

## 2017-12-07 LAB
ANION GAP SERPL CALC-SCNC: 12 MMOL/L — SIGNIFICANT CHANGE UP (ref 5–17)
BUN SERPL-MCNC: 17 MG/DL — SIGNIFICANT CHANGE UP (ref 7–23)
CALCIUM SERPL-MCNC: 8.4 MG/DL — SIGNIFICANT CHANGE UP (ref 8.4–10.5)
CHLORIDE SERPL-SCNC: 97 MMOL/L — SIGNIFICANT CHANGE UP (ref 96–108)
CO2 SERPL-SCNC: 25 MMOL/L — SIGNIFICANT CHANGE UP (ref 22–31)
CREAT SERPL-MCNC: 1.06 MG/DL — SIGNIFICANT CHANGE UP (ref 0.5–1.3)
CULTURE RESULTS: NO GROWTH — SIGNIFICANT CHANGE UP
GLUCOSE SERPL-MCNC: 111 MG/DL — HIGH (ref 70–99)
HCT VFR BLD CALC: 36.4 % — LOW (ref 39–50)
HGB BLD-MCNC: 12 G/DL — LOW (ref 13–17)
MCHC RBC-ENTMCNC: 29.6 PG — SIGNIFICANT CHANGE UP (ref 27–34)
MCHC RBC-ENTMCNC: 33 G/DL — SIGNIFICANT CHANGE UP (ref 32–36)
MCV RBC AUTO: 89.7 FL — SIGNIFICANT CHANGE UP (ref 80–100)
PLATELET # BLD AUTO: 234 K/UL — SIGNIFICANT CHANGE UP (ref 150–400)
POTASSIUM SERPL-MCNC: 4 MMOL/L — SIGNIFICANT CHANGE UP (ref 3.5–5.3)
POTASSIUM SERPL-SCNC: 4 MMOL/L — SIGNIFICANT CHANGE UP (ref 3.5–5.3)
RBC # BLD: 4.06 M/UL — LOW (ref 4.2–5.8)
RBC # FLD: 13.3 % — SIGNIFICANT CHANGE UP (ref 10.3–16.9)
SODIUM SERPL-SCNC: 134 MMOL/L — LOW (ref 135–145)
SPECIMEN SOURCE: SIGNIFICANT CHANGE UP
WBC # BLD: 10.8 K/UL — HIGH (ref 3.8–10.5)
WBC # FLD AUTO: 10.8 K/UL — HIGH (ref 3.8–10.5)

## 2017-12-07 PROCEDURE — 86850 RBC ANTIBODY SCREEN: CPT

## 2017-12-07 PROCEDURE — 85027 COMPLETE CBC AUTOMATED: CPT

## 2017-12-07 PROCEDURE — C1776: CPT

## 2017-12-07 PROCEDURE — 88304 TISSUE EXAM BY PATHOLOGIST: CPT

## 2017-12-07 PROCEDURE — 88331 PATH CONSLTJ SURG 1 BLK 1SPC: CPT

## 2017-12-07 PROCEDURE — C1713: CPT

## 2017-12-07 PROCEDURE — 87070 CULTURE OTHR SPECIMN AEROBIC: CPT

## 2017-12-07 PROCEDURE — 86900 BLOOD TYPING SEROLOGIC ABO: CPT

## 2017-12-07 PROCEDURE — 73560 X-RAY EXAM OF KNEE 1 OR 2: CPT

## 2017-12-07 PROCEDURE — 36415 COLL VENOUS BLD VENIPUNCTURE: CPT

## 2017-12-07 PROCEDURE — 88305 TISSUE EXAM BY PATHOLOGIST: CPT

## 2017-12-07 PROCEDURE — C1889: CPT

## 2017-12-07 PROCEDURE — 87205 SMEAR GRAM STAIN: CPT

## 2017-12-07 PROCEDURE — 97161 PT EVAL LOW COMPLEX 20 MIN: CPT

## 2017-12-07 PROCEDURE — 87075 CULTR BACTERIA EXCEPT BLOOD: CPT

## 2017-12-07 PROCEDURE — 97116 GAIT TRAINING THERAPY: CPT

## 2017-12-07 PROCEDURE — 80048 BASIC METABOLIC PNL TOTAL CA: CPT

## 2017-12-07 PROCEDURE — 90686 IIV4 VACC NO PRSV 0.5 ML IM: CPT

## 2017-12-07 PROCEDURE — 86901 BLOOD TYPING SEROLOGIC RH(D): CPT

## 2017-12-07 RX ORDER — DOCUSATE SODIUM 100 MG
1 CAPSULE ORAL
Qty: 0 | Refills: 0 | COMMUNITY
Start: 2017-12-07

## 2017-12-07 RX ORDER — OXYCODONE HYDROCHLORIDE 5 MG/1
10 TABLET ORAL ONCE
Qty: 0 | Refills: 0 | Status: DISCONTINUED | OUTPATIENT
Start: 2017-12-07 | End: 2017-12-07

## 2017-12-07 RX ORDER — CELECOXIB 200 MG/1
1 CAPSULE ORAL
Qty: 0 | Refills: 0 | COMMUNITY

## 2017-12-07 RX ORDER — OXYCODONE HYDROCHLORIDE 5 MG/1
15 TABLET ORAL EVERY 4 HOURS
Qty: 0 | Refills: 0 | Status: DISCONTINUED | OUTPATIENT
Start: 2017-12-07 | End: 2017-12-07

## 2017-12-07 RX ORDER — ACETAMINOPHEN 500 MG
0 TABLET ORAL
Qty: 0 | Refills: 0 | COMMUNITY

## 2017-12-07 RX ORDER — POLYETHYLENE GLYCOL 3350 17 G/17G
17 POWDER, FOR SOLUTION ORAL
Qty: 0 | Refills: 0 | COMMUNITY
Start: 2017-12-07

## 2017-12-07 RX ORDER — HYDROMORPHONE HYDROCHLORIDE 2 MG/ML
0.5 INJECTION INTRAMUSCULAR; INTRAVENOUS; SUBCUTANEOUS
Qty: 0 | Refills: 0 | Status: DISCONTINUED | OUTPATIENT
Start: 2017-12-07 | End: 2017-12-07

## 2017-12-07 RX ORDER — ACETAMINOPHEN 500 MG
1000 TABLET ORAL ONCE
Qty: 0 | Refills: 0 | Status: COMPLETED | OUTPATIENT
Start: 2017-12-07 | End: 2017-12-07

## 2017-12-07 RX ORDER — ENOXAPARIN SODIUM 100 MG/ML
40 INJECTION SUBCUTANEOUS
Qty: 13 | Refills: 0 | OUTPATIENT
Start: 2017-12-07 | End: 2017-12-20

## 2017-12-07 RX ORDER — CELECOXIB 200 MG/1
200 CAPSULE ORAL
Qty: 0 | Refills: 0 | Status: DISCONTINUED | OUTPATIENT
Start: 2017-12-07 | End: 2017-12-07

## 2017-12-07 RX ORDER — MORPHINE SULFATE 50 MG/1
15 CAPSULE, EXTENDED RELEASE ORAL EVERY 12 HOURS
Qty: 0 | Refills: 0 | Status: DISCONTINUED | OUTPATIENT
Start: 2017-12-07 | End: 2017-12-07

## 2017-12-07 RX ORDER — ASPIRIN/CALCIUM CARB/MAGNESIUM 324 MG
1 TABLET ORAL
Qty: 60 | Refills: 0 | OUTPATIENT
Start: 2017-12-07 | End: 2018-01-06

## 2017-12-07 RX ORDER — INFLUENZA VIRUS VACCINE 15; 15; 15; 15 UG/.5ML; UG/.5ML; UG/.5ML; UG/.5ML
0.5 SUSPENSION INTRAMUSCULAR ONCE
Qty: 0 | Refills: 0 | Status: COMPLETED | OUTPATIENT
Start: 2017-12-07 | End: 2017-12-07

## 2017-12-07 RX ORDER — OXYCODONE HYDROCHLORIDE 5 MG/1
0 TABLET ORAL
Qty: 0 | Refills: 0 | COMMUNITY

## 2017-12-07 RX ORDER — CELECOXIB 200 MG/1
1 CAPSULE ORAL
Qty: 60 | Refills: 0 | OUTPATIENT
Start: 2017-12-07 | End: 2018-01-06

## 2017-12-07 RX ORDER — MORPHINE SULFATE 50 MG/1
1 CAPSULE, EXTENDED RELEASE ORAL
Qty: 14 | Refills: 0 | OUTPATIENT
Start: 2017-12-07

## 2017-12-07 RX ORDER — SENNA PLUS 8.6 MG/1
2 TABLET ORAL
Qty: 0 | Refills: 0 | COMMUNITY
Start: 2017-12-07

## 2017-12-07 RX ORDER — OXYCODONE HYDROCHLORIDE 5 MG/1
5 TABLET ORAL ONCE
Qty: 0 | Refills: 0 | Status: DISCONTINUED | OUTPATIENT
Start: 2017-12-07 | End: 2017-12-07

## 2017-12-07 RX ORDER — KETOROLAC TROMETHAMINE 30 MG/ML
15 SYRINGE (ML) INJECTION ONCE
Qty: 0 | Refills: 0 | Status: DISCONTINUED | OUTPATIENT
Start: 2017-12-07 | End: 2017-12-07

## 2017-12-07 RX ADMIN — ESCITALOPRAM OXALATE 10 MILLIGRAM(S): 10 TABLET, FILM COATED ORAL at 11:13

## 2017-12-07 RX ADMIN — ENOXAPARIN SODIUM 40 MILLIGRAM(S): 100 INJECTION SUBCUTANEOUS at 05:01

## 2017-12-07 RX ADMIN — OXYCODONE HYDROCHLORIDE 15 MILLIGRAM(S): 5 TABLET ORAL at 14:20

## 2017-12-07 RX ADMIN — OXYCODONE HYDROCHLORIDE 10 MILLIGRAM(S): 5 TABLET ORAL at 10:00

## 2017-12-07 RX ADMIN — MORPHINE SULFATE 15 MILLIGRAM(S): 50 CAPSULE, EXTENDED RELEASE ORAL at 10:57

## 2017-12-07 RX ADMIN — OXYCODONE HYDROCHLORIDE 15 MILLIGRAM(S): 5 TABLET ORAL at 13:20

## 2017-12-07 RX ADMIN — Medication 100 MILLIGRAM(S): at 05:00

## 2017-12-07 RX ADMIN — LOSARTAN POTASSIUM 50 MILLIGRAM(S): 100 TABLET, FILM COATED ORAL at 05:00

## 2017-12-07 RX ADMIN — Medication 15 MILLIGRAM(S): at 08:06

## 2017-12-07 RX ADMIN — OXYCODONE HYDROCHLORIDE 5 MILLIGRAM(S): 5 TABLET ORAL at 09:18

## 2017-12-07 RX ADMIN — HYDROMORPHONE HYDROCHLORIDE 0.5 MILLIGRAM(S): 2 INJECTION INTRAMUSCULAR; INTRAVENOUS; SUBCUTANEOUS at 06:18

## 2017-12-07 RX ADMIN — Medication 1000 MILLIGRAM(S): at 09:36

## 2017-12-07 RX ADMIN — OXYCODONE HYDROCHLORIDE 10 MILLIGRAM(S): 5 TABLET ORAL at 02:00

## 2017-12-07 RX ADMIN — MORPHINE SULFATE 15 MILLIGRAM(S): 50 CAPSULE, EXTENDED RELEASE ORAL at 09:57

## 2017-12-07 RX ADMIN — OXYCODONE HYDROCHLORIDE 10 MILLIGRAM(S): 5 TABLET ORAL at 16:06

## 2017-12-07 RX ADMIN — POLYETHYLENE GLYCOL 3350 17 GRAM(S): 17 POWDER, FOR SOLUTION ORAL at 11:13

## 2017-12-07 RX ADMIN — OXYCODONE HYDROCHLORIDE 5 MILLIGRAM(S): 5 TABLET ORAL at 10:18

## 2017-12-07 RX ADMIN — OXYCODONE HYDROCHLORIDE 10 MILLIGRAM(S): 5 TABLET ORAL at 06:00

## 2017-12-07 RX ADMIN — Medication 100 MILLIGRAM(S): at 11:13

## 2017-12-07 RX ADMIN — Medication 15 MILLIGRAM(S): at 08:22

## 2017-12-07 RX ADMIN — INFLUENZA VIRUS VACCINE 0.5 MILLILITER(S): 15; 15; 15; 15 SUSPENSION INTRAMUSCULAR at 11:14

## 2017-12-07 RX ADMIN — OXYCODONE HYDROCHLORIDE 10 MILLIGRAM(S): 5 TABLET ORAL at 01:00

## 2017-12-07 RX ADMIN — OXYCODONE HYDROCHLORIDE 10 MILLIGRAM(S): 5 TABLET ORAL at 05:00

## 2017-12-07 RX ADMIN — HYDROMORPHONE HYDROCHLORIDE 0.5 MILLIGRAM(S): 2 INJECTION INTRAMUSCULAR; INTRAVENOUS; SUBCUTANEOUS at 06:33

## 2017-12-07 RX ADMIN — OXYCODONE HYDROCHLORIDE 10 MILLIGRAM(S): 5 TABLET ORAL at 09:00

## 2017-12-07 RX ADMIN — Medication 25 MILLIGRAM(S): at 13:20

## 2017-12-07 RX ADMIN — Medication 400 MILLIGRAM(S): at 09:18

## 2017-12-07 NOTE — PROGRESS NOTE ADULT - SUBJECTIVE AND OBJECTIVE BOX
ORTHO NOTE    [x ] Pt seen/examined.  [x ] Pt without any complaints/in NAD.    [ ] Pt complains of:      ROS: [ ] Fever  [ ] Chills  [ ] CP [ ] SOB [ ] Dysnea  [ ] Palpitations [ ] Cough [ ] N/V/C/D [ ] Paresthia [ ] Other     [ x] ROS  otherwise negative    .    PHYSICAL EXAM:    Vital Signs Last 24 Hrs  T(C): 36.6 (07 Dec 2017 10:17), Max: 36.8 (06 Dec 2017 15:13)  T(F): 97.8 (07 Dec 2017 10:17), Max: 98.2 (06 Dec 2017 15:13)  HR: 65 (07 Dec 2017 10:17) (65 - 76)  BP: 162/83 (07 Dec 2017 10:17) (146/75 - 162/83)  BP(mean): --  RR: 16 (07 Dec 2017 10:17) (16 - 17)  SpO2: 98% (07 Dec 2017 10:17) (95% - 98%)    I&O's Detail    06 Dec 2017 07:01  -  07 Dec 2017 07:00  --------------------------------------------------------  IN:    Oral Fluid: 360 mL  Total IN: 360 mL    OUT:    Accordian: 340 mL    Voided: 1950 mL  Total OUT: 2290 mL    Total NET: -1930 mL           CAPILLARY BLOOD GLUCOSE                      Neuro: AAOX3    Lungs: CTA, IS demonstrated    CV:    ABD: soft, nontender    Ext: right knee aquacel cdi, R LE NVID motor 5/5    LABS                        12.0   10.8  )-----------( 234      ( 07 Dec 2017 07:29 )             36.4                                12-07    134<L>  |  97  |  17  ----------------------------<  111<H>  4.0   |  25  |  1.06    Ca    8.4      07 Dec 2017 07:29        [ ] Other Labs  [ ] None ordered            Please check or [place] when present:  •  Heart Failure:    [ ] Acute        [ ]  Acute on Chronic        [ ] Chronic         [ ] Diastolic     [ ]  Combined    •  DAMARIS:     [ ] ATN        [ ]  Renal medullary necrosis       [ ]  Renal cortical necrosis                  [ ] Other pathological Lesion:  •  CKD:  [ ] Stage I   [ ] Stage II  [ ] Stage III    [ ]Stage IV   [ ]  CKD V   [ ]  Other/Unspecified:    •  Abdominal Nutritional Status:   [ ] Malnutrition-See Nutrition note    [ ] Cachexia   [ ]  Other        [ ] Supplement ordered:            [ ] Morbid Obesity: BMI >=40         ASSESSMENT/PLAN:      STATUS POST: R TKA pod2  H/H stable  Adjusted pain regimen to oxycodone 10 to 15mg q 4 hours prn, pain and MS Contin 15mg bid  IV tylenol  CONTINUE:          [ ] PT- wbat    [ ] DVT PPX- scd, lovenox 40mg daily    [ ] Pain Mgt- see above    [ ] Dispo plan- home

## 2017-12-08 LAB — SURGICAL PATHOLOGY STUDY: SIGNIFICANT CHANGE UP
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
## 2017-12-13 DIAGNOSIS — T84.032A MECHANICAL LOOSENING OF INTERNAL RIGHT KNEE PROSTHETIC JOINT, INITIAL ENCOUNTER: ICD-10-CM

## 2017-12-13 DIAGNOSIS — Z88.0 ALLERGY STATUS TO PENICILLIN: ICD-10-CM

## 2017-12-13 DIAGNOSIS — Z91.013 ALLERGY TO SEAFOOD: ICD-10-CM

## 2017-12-13 DIAGNOSIS — Z83.3 FAMILY HISTORY OF DIABETES MELLITUS: ICD-10-CM

## 2017-12-13 DIAGNOSIS — F41.9 ANXIETY DISORDER, UNSPECIFIED: ICD-10-CM

## 2017-12-13 DIAGNOSIS — I10 ESSENTIAL (PRIMARY) HYPERTENSION: ICD-10-CM

## 2017-12-13 DIAGNOSIS — Z82.49 FAMILY HISTORY OF ISCHEMIC HEART DISEASE AND OTHER DISEASES OF THE CIRCULATORY SYSTEM: ICD-10-CM

## 2017-12-26 ENCOUNTER — APPOINTMENT (OUTPATIENT)
Dept: ORTHOPEDIC SURGERY | Facility: CLINIC | Age: 62
End: 2017-12-26
Payer: COMMERCIAL

## 2017-12-26 PROCEDURE — 99024 POSTOP FOLLOW-UP VISIT: CPT

## 2018-01-16 ENCOUNTER — APPOINTMENT (OUTPATIENT)
Dept: ORTHOPEDIC SURGERY | Facility: CLINIC | Age: 63
End: 2018-01-16
Payer: COMMERCIAL

## 2018-01-16 PROCEDURE — 99024 POSTOP FOLLOW-UP VISIT: CPT

## 2018-01-26 ENCOUNTER — APPOINTMENT (OUTPATIENT)
Dept: ORTHOPEDIC SURGERY | Facility: CLINIC | Age: 63
End: 2018-01-26
Payer: COMMERCIAL

## 2018-01-26 PROCEDURE — 73562 X-RAY EXAM OF KNEE 3: CPT | Mod: RT

## 2018-01-26 PROCEDURE — 99024 POSTOP FOLLOW-UP VISIT: CPT

## 2018-01-26 PROCEDURE — 73560 X-RAY EXAM OF KNEE 1 OR 2: CPT | Mod: LT

## 2018-03-12 ENCOUNTER — APPOINTMENT (OUTPATIENT)
Dept: ORTHOPEDIC SURGERY | Facility: CLINIC | Age: 63
End: 2018-03-12
Payer: COMMERCIAL

## 2018-03-12 VITALS
HEIGHT: 72 IN | SYSTOLIC BLOOD PRESSURE: 168 MMHG | HEART RATE: 63 BPM | BODY MASS INDEX: 29.8 KG/M2 | WEIGHT: 220 LBS | DIASTOLIC BLOOD PRESSURE: 83 MMHG

## 2018-03-12 PROCEDURE — 73564 X-RAY EXAM KNEE 4 OR MORE: CPT | Mod: LT

## 2018-03-12 PROCEDURE — 73562 X-RAY EXAM OF KNEE 3: CPT | Mod: RT

## 2018-03-12 PROCEDURE — 99214 OFFICE O/P EST MOD 30 MIN: CPT

## 2018-03-14 ENCOUNTER — RX RENEWAL (OUTPATIENT)
Age: 63
End: 2018-03-14

## 2018-04-09 ENCOUNTER — APPOINTMENT (OUTPATIENT)
Dept: SURGERY | Facility: CLINIC | Age: 63
End: 2018-04-09

## 2018-04-09 ENCOUNTER — OUTPATIENT (OUTPATIENT)
Dept: OUTPATIENT SERVICES | Facility: HOSPITAL | Age: 63
LOS: 1 days | End: 2018-04-09
Payer: COMMERCIAL

## 2018-04-09 VITALS
TEMPERATURE: 99 F | RESPIRATION RATE: 16 BRPM | WEIGHT: 230.16 LBS | DIASTOLIC BLOOD PRESSURE: 86 MMHG | HEIGHT: 71 IN | HEART RATE: 59 BPM | SYSTOLIC BLOOD PRESSURE: 150 MMHG

## 2018-04-09 DIAGNOSIS — F41.9 ANXIETY DISORDER, UNSPECIFIED: ICD-10-CM

## 2018-04-09 DIAGNOSIS — Z01.818 ENCOUNTER FOR OTHER PREPROCEDURAL EXAMINATION: ICD-10-CM

## 2018-04-09 DIAGNOSIS — T84.033A MECHANICAL LOOSENING OF INTERNAL LEFT KNEE PROSTHETIC JOINT, INITIAL ENCOUNTER: ICD-10-CM

## 2018-04-09 DIAGNOSIS — Z98.890 OTHER SPECIFIED POSTPROCEDURAL STATES: Chronic | ICD-10-CM

## 2018-04-09 DIAGNOSIS — T84.033D MECHANICAL LOOSENING OF INTERNAL LEFT KNEE PROSTHETIC JOINT, SUBSEQUENT ENCOUNTER: ICD-10-CM

## 2018-04-09 DIAGNOSIS — Z96.651 PRESENCE OF RIGHT ARTIFICIAL KNEE JOINT: Chronic | ICD-10-CM

## 2018-04-09 DIAGNOSIS — Z96.652 PRESENCE OF LEFT ARTIFICIAL KNEE JOINT: Chronic | ICD-10-CM

## 2018-04-09 DIAGNOSIS — I10 ESSENTIAL (PRIMARY) HYPERTENSION: ICD-10-CM

## 2018-04-09 LAB
ALBUMIN SERPL ELPH-MCNC: 4.4 G/DL — SIGNIFICANT CHANGE UP (ref 3.3–5)
ALP SERPL-CCNC: 83 U/L — SIGNIFICANT CHANGE UP (ref 40–120)
ALT FLD-CCNC: 27 U/L — SIGNIFICANT CHANGE UP (ref 10–45)
ANION GAP SERPL CALC-SCNC: 14 MMOL/L — SIGNIFICANT CHANGE UP (ref 5–17)
APPEARANCE UR: CLEAR — SIGNIFICANT CHANGE UP
APTT BLD: 30.7 SEC — SIGNIFICANT CHANGE UP (ref 27.5–37.4)
AST SERPL-CCNC: 21 U/L — SIGNIFICANT CHANGE UP (ref 10–40)
BILIRUB SERPL-MCNC: 0.4 MG/DL — SIGNIFICANT CHANGE UP (ref 0.2–1.2)
BILIRUB UR-MCNC: NEGATIVE — SIGNIFICANT CHANGE UP
BUN SERPL-MCNC: 25 MG/DL — HIGH (ref 7–23)
CALCIUM SERPL-MCNC: 9.9 MG/DL — SIGNIFICANT CHANGE UP (ref 8.4–10.5)
CHLORIDE SERPL-SCNC: 102 MMOL/L — SIGNIFICANT CHANGE UP (ref 96–108)
CO2 SERPL-SCNC: 23 MMOL/L — SIGNIFICANT CHANGE UP (ref 22–31)
COLOR SPEC: YELLOW — SIGNIFICANT CHANGE UP
CREAT SERPL-MCNC: 1.18 MG/DL — SIGNIFICANT CHANGE UP (ref 0.5–1.3)
DIFF PNL FLD: NEGATIVE — SIGNIFICANT CHANGE UP
GLUCOSE SERPL-MCNC: 110 MG/DL — HIGH (ref 70–99)
GLUCOSE UR QL: NEGATIVE — SIGNIFICANT CHANGE UP
HCT VFR BLD CALC: 44.8 % — SIGNIFICANT CHANGE UP (ref 39–50)
HGB BLD-MCNC: 14.2 G/DL — SIGNIFICANT CHANGE UP (ref 13–17)
INR BLD: 0.97 — SIGNIFICANT CHANGE UP (ref 0.88–1.16)
KETONES UR-MCNC: NEGATIVE — SIGNIFICANT CHANGE UP
LEUKOCYTE ESTERASE UR-ACNC: NEGATIVE — SIGNIFICANT CHANGE UP
MCHC RBC-ENTMCNC: 28.1 PG — SIGNIFICANT CHANGE UP (ref 27–34)
MCHC RBC-ENTMCNC: 31.7 G/DL — LOW (ref 32–36)
MCV RBC AUTO: 88.5 FL — SIGNIFICANT CHANGE UP (ref 80–100)
MRSA PCR RESULT.: NEGATIVE — SIGNIFICANT CHANGE UP
NITRITE UR-MCNC: NEGATIVE — SIGNIFICANT CHANGE UP
PH UR: 5 — SIGNIFICANT CHANGE UP (ref 5–8)
PLATELET # BLD AUTO: 287 K/UL — SIGNIFICANT CHANGE UP (ref 150–400)
POTASSIUM SERPL-MCNC: 4.5 MMOL/L — SIGNIFICANT CHANGE UP (ref 3.5–5.3)
POTASSIUM SERPL-SCNC: 4.5 MMOL/L — SIGNIFICANT CHANGE UP (ref 3.5–5.3)
PROT SERPL-MCNC: 8.1 G/DL — SIGNIFICANT CHANGE UP (ref 6–8.3)
PROT UR-MCNC: NEGATIVE MG/DL — SIGNIFICANT CHANGE UP
PROTHROM AB SERPL-ACNC: 10.8 SEC — SIGNIFICANT CHANGE UP (ref 9.8–12.7)
RBC # BLD: 5.06 M/UL — SIGNIFICANT CHANGE UP (ref 4.2–5.8)
RBC # FLD: 14.9 % — SIGNIFICANT CHANGE UP (ref 10.3–16.9)
S AUREUS DNA NOSE QL NAA+PROBE: NEGATIVE — SIGNIFICANT CHANGE UP
SODIUM SERPL-SCNC: 139 MMOL/L — SIGNIFICANT CHANGE UP (ref 135–145)
SP GR SPEC: 1.01 — SIGNIFICANT CHANGE UP (ref 1–1.03)
UROBILINOGEN FLD QL: 0.2 E.U./DL — SIGNIFICANT CHANGE UP
WBC # BLD: 8.4 K/UL — SIGNIFICANT CHANGE UP (ref 3.8–10.5)
WBC # FLD AUTO: 8.4 K/UL — SIGNIFICANT CHANGE UP (ref 3.8–10.5)

## 2018-04-09 PROCEDURE — 93010 ELECTROCARDIOGRAM REPORT: CPT | Mod: NC

## 2018-04-09 NOTE — H&P PST ADULT - HISTORY OF PRESENT ILLNESS
63 year old male with osteoarthritis left knee 63 year old white male with osteoarthritis left knee s/p left TKR about 6 years ago and revision 2/12 years later.  Patient now with 1 1/2 years of moderate left knee pain, deformity decreased ROM and unsteady gait.  X rays consistent with failed left TKR.   Symptoms worse with stairs and after prolonged imobility.

## 2018-04-09 NOTE — PATIENT PROFILE ADULT. - PSH
H/O total knee replacement, left  2010  H/O total knee replacement, right  2009  History of surgery  left knee revision of total knee replacement (2013)  History of surgery  right knee total knee replacement revision (2015)  History of surgery  left leg fracture repair with hardware (2011) H/O total knee replacement, left  2010  H/O total knee replacement, right  2009 revision x2  History of surgery  left knee revision of total knee replacement (2013)  History of surgery  right knee total knee replacement revision (2015)  History of surgery  left ankle TIB/FIB repair secondary to combound FX.

## 2018-04-09 NOTE — PATIENT PROFILE ADULT. - HEALTHCARE INFORMATION NEEDED, PROFILE
n/a patient is hard of hearing patient is hard of hearing please becareful with patient right shoulder.

## 2018-04-10 LAB
CULTURE RESULTS: NO GROWTH — SIGNIFICANT CHANGE UP
SPECIMEN SOURCE: SIGNIFICANT CHANGE UP

## 2018-04-22 NOTE — H&P ADULT - NSHPLABSRESULTS_GEN_ALL_CORE
Left knee x-rays demonstrate Left TKR with stem and medially augmented tibial component with diffuse radio lucent lines, significant tibial bone loss benieath wedge, expansion of lateral tibial cortex, radio lucent lines adjacent to anterior and posterior condyle of femoral component and the patella.

## 2018-04-22 NOTE — H&P ADULT - HISTORY OF PRESENT ILLNESS
63 y.o. M. with h/o Anxiety, HTN and failed Left TKR done in 2009 and revision in 2013 by Dr. Mayer with pain, swelling and decreased mobility since the surgery presents at [facility] for his Left knee Revision TKR with Dr. Davila.

## 2018-04-22 NOTE — H&P ADULT - NSHPPHYSICALEXAM_GEN_ALL_CORE
Left knee healed midline incision, mild effusion, ROM 0-120 degrees with pain through arc of motion.

## 2018-04-23 VITALS
OXYGEN SATURATION: 97 % | WEIGHT: 229.06 LBS | TEMPERATURE: 98 F | HEART RATE: 60 BPM | DIASTOLIC BLOOD PRESSURE: 81 MMHG | HEIGHT: 71 IN | SYSTOLIC BLOOD PRESSURE: 160 MMHG | RESPIRATION RATE: 16 BRPM

## 2018-04-23 PROBLEM — I10 ESSENTIAL (PRIMARY) HYPERTENSION: Chronic | Status: INACTIVE | Noted: 2017-12-04 | Resolved: 2018-04-22

## 2018-04-24 ENCOUNTER — RESULT REVIEW (OUTPATIENT)
Age: 63
End: 2018-04-24

## 2018-04-24 ENCOUNTER — INPATIENT (INPATIENT)
Facility: HOSPITAL | Age: 63
LOS: 1 days | Discharge: HOME HEALTH | DRG: 468 | End: 2018-04-26
Attending: ORTHOPAEDIC SURGERY | Admitting: ORTHOPAEDIC SURGERY
Payer: COMMERCIAL

## 2018-04-24 ENCOUNTER — APPOINTMENT (OUTPATIENT)
Dept: ORTHOPEDIC SURGERY | Facility: HOSPITAL | Age: 63
End: 2018-04-24

## 2018-04-24 DIAGNOSIS — Z98.890 OTHER SPECIFIED POSTPROCEDURAL STATES: Chronic | ICD-10-CM

## 2018-04-24 DIAGNOSIS — J30.9 ALLERGIC RHINITIS, UNSPECIFIED: ICD-10-CM
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
## 2018-04-24 DIAGNOSIS — Z96.652 PRESENCE OF LEFT ARTIFICIAL KNEE JOINT: Chronic | ICD-10-CM

## 2018-04-24 DIAGNOSIS — F41.9 ANXIETY DISORDER, UNSPECIFIED: ICD-10-CM

## 2018-04-24 DIAGNOSIS — T84.038D MECHANICAL LOOSENING OF OTHER INTERNAL PROSTHETIC JOINT, SUBSEQUENT ENCOUNTER: ICD-10-CM

## 2018-04-24 DIAGNOSIS — I10 ESSENTIAL (PRIMARY) HYPERTENSION: ICD-10-CM

## 2018-04-24 DIAGNOSIS — Z96.651 PRESENCE OF RIGHT ARTIFICIAL KNEE JOINT: Chronic | ICD-10-CM

## 2018-04-24 LAB
BASOPHILS NFR BLD AUTO: 0.3 % — SIGNIFICANT CHANGE UP (ref 0–2)
EOSINOPHIL NFR BLD AUTO: 0.6 % — SIGNIFICANT CHANGE UP (ref 0–6)
GRAM STN FLD: SIGNIFICANT CHANGE UP
HCT VFR BLD CALC: 40.4 % — SIGNIFICANT CHANGE UP (ref 39–50)
HGB BLD-MCNC: 13.4 G/DL — SIGNIFICANT CHANGE UP (ref 13–17)
LYMPHOCYTES # BLD AUTO: 12.3 % — LOW (ref 13–44)
MCHC RBC-ENTMCNC: 28.8 PG — SIGNIFICANT CHANGE UP (ref 27–34)
MCHC RBC-ENTMCNC: 33.2 G/DL — SIGNIFICANT CHANGE UP (ref 32–36)
MCV RBC AUTO: 86.9 FL — SIGNIFICANT CHANGE UP (ref 80–100)
MONOCYTES NFR BLD AUTO: 2.4 % — SIGNIFICANT CHANGE UP (ref 2–14)
NEUTROPHILS NFR BLD AUTO: 84.4 % — HIGH (ref 43–77)
PLATELET # BLD AUTO: 234 K/UL — SIGNIFICANT CHANGE UP (ref 150–400)
RBC # BLD: 4.65 M/UL — SIGNIFICANT CHANGE UP (ref 4.2–5.8)
RBC # FLD: 14.6 % — SIGNIFICANT CHANGE UP (ref 10.3–16.9)
SPECIMEN SOURCE: SIGNIFICANT CHANGE UP
WBC # BLD: 11.4 K/UL — HIGH (ref 3.8–10.5)
WBC # FLD AUTO: 11.4 K/UL — HIGH (ref 3.8–10.5)

## 2018-04-24 PROCEDURE — 27487 REVISE/REPLACE KNEE JOINT: CPT | Mod: LT

## 2018-04-24 PROCEDURE — 73560 X-RAY EXAM OF KNEE 1 OR 2: CPT | Mod: 26,LT

## 2018-04-24 PROCEDURE — 27415 OSTEOCHONDRAL KNEE ALLOGRAFT: CPT | Mod: 52

## 2018-04-24 RX ORDER — ACETAMINOPHEN 500 MG
650 TABLET ORAL EVERY 6 HOURS
Qty: 0 | Refills: 0 | Status: DISCONTINUED | OUTPATIENT
Start: 2018-04-24 | End: 2018-04-26

## 2018-04-24 RX ORDER — DOCUSATE SODIUM 100 MG
100 CAPSULE ORAL THREE TIMES A DAY
Qty: 0 | Refills: 0 | Status: DISCONTINUED | OUTPATIENT
Start: 2018-04-24 | End: 2018-04-26

## 2018-04-24 RX ORDER — LOSARTAN POTASSIUM 100 MG/1
50 TABLET, FILM COATED ORAL DAILY
Qty: 0 | Refills: 0 | Status: DISCONTINUED | OUTPATIENT
Start: 2018-04-24 | End: 2018-04-26

## 2018-04-24 RX ORDER — ENOXAPARIN SODIUM 100 MG/ML
40 INJECTION SUBCUTANEOUS EVERY 24 HOURS
Qty: 0 | Refills: 0 | Status: DISCONTINUED | OUTPATIENT
Start: 2018-04-25 | End: 2018-04-26

## 2018-04-24 RX ORDER — OXYCODONE HYDROCHLORIDE 5 MG/1
10 TABLET ORAL EVERY 4 HOURS
Qty: 0 | Refills: 0 | Status: DISCONTINUED | OUTPATIENT
Start: 2018-04-24 | End: 2018-04-24

## 2018-04-24 RX ORDER — ACETAMINOPHEN 500 MG
1000 TABLET ORAL ONCE
Qty: 0 | Refills: 0 | Status: COMPLETED | OUTPATIENT
Start: 2018-04-24 | End: 2018-04-25

## 2018-04-24 RX ORDER — SENNA PLUS 8.6 MG/1
2 TABLET ORAL AT BEDTIME
Qty: 0 | Refills: 0 | Status: DISCONTINUED | OUTPATIENT
Start: 2018-04-24 | End: 2018-04-26

## 2018-04-24 RX ORDER — HYDROMORPHONE HYDROCHLORIDE 2 MG/ML
0.5 INJECTION INTRAMUSCULAR; INTRAVENOUS; SUBCUTANEOUS
Qty: 0 | Refills: 0 | Status: DISCONTINUED | OUTPATIENT
Start: 2018-04-24 | End: 2018-04-26

## 2018-04-24 RX ORDER — VANCOMYCIN HCL 1 G
1500 VIAL (EA) INTRAVENOUS ONCE
Qty: 0 | Refills: 0 | Status: COMPLETED | OUTPATIENT
Start: 2018-04-25 | End: 2018-04-25

## 2018-04-24 RX ORDER — OXYCODONE HYDROCHLORIDE 5 MG/1
5 TABLET ORAL EVERY 4 HOURS
Qty: 0 | Refills: 0 | Status: DISCONTINUED | OUTPATIENT
Start: 2018-04-24 | End: 2018-04-24

## 2018-04-24 RX ORDER — DIPHENHYDRAMINE HCL 50 MG
50 CAPSULE ORAL ONCE
Qty: 0 | Refills: 0 | Status: DISCONTINUED | OUTPATIENT
Start: 2018-04-24 | End: 2018-04-26

## 2018-04-24 RX ORDER — OXYCODONE HYDROCHLORIDE 5 MG/1
10 TABLET ORAL EVERY 4 HOURS
Qty: 0 | Refills: 0 | Status: DISCONTINUED | OUTPATIENT
Start: 2018-04-24 | End: 2018-04-26

## 2018-04-24 RX ORDER — MAGNESIUM HYDROXIDE 400 MG/1
30 TABLET, CHEWABLE ORAL DAILY
Qty: 0 | Refills: 0 | Status: DISCONTINUED | OUTPATIENT
Start: 2018-04-24 | End: 2018-04-26

## 2018-04-24 RX ORDER — ESCITALOPRAM OXALATE 10 MG/1
10 TABLET, FILM COATED ORAL DAILY
Qty: 0 | Refills: 0 | Status: DISCONTINUED | OUTPATIENT
Start: 2018-04-24 | End: 2018-04-26

## 2018-04-24 RX ORDER — DIPHENHYDRAMINE HCL 50 MG
50 CAPSULE ORAL ONCE
Qty: 0 | Refills: 0 | Status: COMPLETED | OUTPATIENT
Start: 2018-04-24 | End: 2018-04-24

## 2018-04-24 RX ORDER — BUPIVACAINE 13.3 MG/ML
20 INJECTION, SUSPENSION, LIPOSOMAL INFILTRATION ONCE
Qty: 0 | Refills: 0 | Status: DISCONTINUED | OUTPATIENT
Start: 2018-04-24 | End: 2018-04-26

## 2018-04-24 RX ORDER — ONDANSETRON 8 MG/1
4 TABLET, FILM COATED ORAL EVERY 6 HOURS
Qty: 0 | Refills: 0 | Status: DISCONTINUED | OUTPATIENT
Start: 2018-04-24 | End: 2018-04-26

## 2018-04-24 RX ORDER — SODIUM CHLORIDE 9 MG/ML
1000 INJECTION, SOLUTION INTRAVENOUS
Qty: 0 | Refills: 0 | Status: DISCONTINUED | OUTPATIENT
Start: 2018-04-24 | End: 2018-04-25

## 2018-04-24 RX ORDER — CELECOXIB 200 MG/1
200 CAPSULE ORAL DAILY
Qty: 0 | Refills: 0 | Status: DISCONTINUED | OUTPATIENT
Start: 2018-04-25 | End: 2018-04-26

## 2018-04-24 RX ORDER — OXYCODONE HYDROCHLORIDE 5 MG/1
20 TABLET ORAL EVERY 12 HOURS
Qty: 0 | Refills: 0 | Status: DISCONTINUED | OUTPATIENT
Start: 2018-04-24 | End: 2018-04-26

## 2018-04-24 RX ORDER — HYDROMORPHONE HYDROCHLORIDE 2 MG/ML
0.5 INJECTION INTRAMUSCULAR; INTRAVENOUS; SUBCUTANEOUS
Qty: 0 | Refills: 0 | Status: DISCONTINUED | OUTPATIENT
Start: 2018-04-24 | End: 2018-04-24

## 2018-04-24 RX ORDER — POLYETHYLENE GLYCOL 3350 17 G/17G
17 POWDER, FOR SOLUTION ORAL DAILY
Qty: 0 | Refills: 0 | Status: DISCONTINUED | OUTPATIENT
Start: 2018-04-24 | End: 2018-04-26

## 2018-04-24 RX ORDER — OXYCODONE HYDROCHLORIDE 5 MG/1
5 TABLET ORAL EVERY 4 HOURS
Qty: 0 | Refills: 0 | Status: DISCONTINUED | OUTPATIENT
Start: 2018-04-24 | End: 2018-04-26

## 2018-04-24 RX ADMIN — HYDROMORPHONE HYDROCHLORIDE 0.5 MILLIGRAM(S): 2 INJECTION INTRAMUSCULAR; INTRAVENOUS; SUBCUTANEOUS at 18:49

## 2018-04-24 RX ADMIN — OXYCODONE HYDROCHLORIDE 5 MILLIGRAM(S): 5 TABLET ORAL at 20:15

## 2018-04-24 RX ADMIN — HYDROMORPHONE HYDROCHLORIDE 0.5 MILLIGRAM(S): 2 INJECTION INTRAMUSCULAR; INTRAVENOUS; SUBCUTANEOUS at 20:05

## 2018-04-24 RX ADMIN — OXYCODONE HYDROCHLORIDE 20 MILLIGRAM(S): 5 TABLET ORAL at 22:06

## 2018-04-24 RX ADMIN — HYDROMORPHONE HYDROCHLORIDE 0.5 MILLIGRAM(S): 2 INJECTION INTRAMUSCULAR; INTRAVENOUS; SUBCUTANEOUS at 19:51

## 2018-04-24 RX ADMIN — HYDROMORPHONE HYDROCHLORIDE 0.5 MILLIGRAM(S): 2 INJECTION INTRAMUSCULAR; INTRAVENOUS; SUBCUTANEOUS at 20:14

## 2018-04-24 RX ADMIN — Medication 100 MILLIGRAM(S): at 22:06

## 2018-04-24 RX ADMIN — HYDROMORPHONE HYDROCHLORIDE 0.5 MILLIGRAM(S): 2 INJECTION INTRAMUSCULAR; INTRAVENOUS; SUBCUTANEOUS at 19:30

## 2018-04-24 NOTE — PHYSICAL THERAPY INITIAL EVALUATION ADULT - IMPAIRED TRANSFERS: SIT/STAND, REHAB EVAL
impaired motor control/pain/decreased ROM/impaired balance/decreased strength/impaired sensory feedback

## 2018-04-24 NOTE — PHYSICAL THERAPY INITIAL EVALUATION ADULT - IMPAIRMENTS CONTRIBUTING TO GAIT DEVIATIONS, PT EVAL
impaired balance/pain/decreased ROM/impaired motor control/impaired sensory feedback/decreased strength

## 2018-04-24 NOTE — CONSULT NOTE ADULT - SUBJECTIVE AND OBJECTIVE BOX
HPI:  63 year old white male with osteoarthritis left knee s/p left TKR about 6 years ago and revision 2/12 years later.  Patient now with 1 1/2 years of moderate left knee pain, deformity decreased ROM and unsteady gait.  X rays consistent with failed left TKR.   Symptoms worse with stairs and after prolonged imobility.    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      REVIEW OF SYSTEMS    General:  normal	  Skin/Breast: normal  Ophthalmologic: negative  ENMT:	normal  Respiratory and Thorax: normal  Cardiovascular:	normal  Gastrointestinal:	normal  Genitourinary:	normal  Musculoskeletal:	left knee swelling   Neurological:	normal  Psychiatric:	normal  Hematology/Lymphatics:	 negative  Endocrine:	negative  Allergic/Immunologic:	negative      MEDICATIONS     oxyCODONE-acetaminophen 10 mg-325 mg oral tablet: 1 to 1 1/2  tab(s) orally every 4 to 6 hours, as needed, pain MDD:8  · 	losartan 50 mg oral tablet: 1 tab(s) orally once a day  · 	escitalopram 10 mg oral tablet: 1 tab(s) orally once a day    Allergies    penicillin (Other)  shellfish (Short breath; Swelling)    SOCIAL HISTORY: no cigs social alcohol    FAMILY HISTORY:  Family history of diabetes mellitus in father (Father)  Family history of coronary artery disease in father (Father)  Family history of hypertension in father (Father)      PHYSICAL EXAM:  Daily Height in cm: 180.34 (23 Apr 2018 12:02)       Vital Signs Last 24 Hrs  T(C): 36.7 (23 Apr 2018 12:02), Max: 36.7 (23 Apr 2018 12:02)  T(F): 98 (23 Apr 2018 12:02), Max: 98 (23 Apr 2018 12:02)  HR: 60 (23 Apr 2018 12:02) (60 - 60)  BP: 160/81 (23 Apr 2018 12:02) (160/81 - 160/81)  BP(mean): --  RR: 16 (23 Apr 2018 12:02) (16 - 16)  SpO2: 97% (23 Apr 2018 12:02) (97% - 97%)    Constitutional: WDWNM in NAD  Eyes: conj pink  ENMT: negative  Neck: supple  Breasts: not examined   Back: negative  Respiratory: clear to P&A  Cardiovascular: no MRGT or H  Gastrointestinal: normal bowel sounds  Genitourinary: neg  Rectal: not examined  Extremities: normal  Vascular: normal  Neurological: normal  Skin: negative  Lymph Nodes: negative  Musculoskeletal:   decreased ROM  left knee  Psychiatric: anxiety

## 2018-04-24 NOTE — PHYSICAL THERAPY INITIAL EVALUATION ADULT - GENERAL OBSERVATIONS, REHAB EVAL
Patient received in semi-santana position, no apparent distress, +telemetry, +intravenous line, +sequential pneumatic compression device, +hemo vac.

## 2018-04-24 NOTE — BRIEF OPERATIVE NOTE - PROCEDURE
<<-----Click on this checkbox to enter Procedure Revision total knee arthroplasty  04/24/2018    Active  [id number]

## 2018-04-24 NOTE — PHYSICAL THERAPY INITIAL EVALUATION ADULT - ADDITIONAL COMMENTS
With prior sx's, pt reports using axillary crutches. However, as of recent, pt denies use of DME. No stairs to negotiate, however reports when necessary to negotiate stairs, pt does not have the strength to perform them well and needs a railing.

## 2018-04-24 NOTE — PHYSICAL THERAPY INITIAL EVALUATION ADULT - ACTIVE RANGE OF MOTION EXAMINATION, REHAB EVAL
bilateral upper extremity Active ROM was WFL (within functional limits)/Left knee 0-75 degrees/Right LE Active ROM was WFL (within functional limits)

## 2018-04-24 NOTE — PHYSICAL THERAPY INITIAL EVALUATION ADULT - GAIT DEVIATIONS NOTED, PT EVAL
decreased arabella/increased time in double stance/decreased weight-shifting ability/decreased stride length

## 2018-04-25 ENCOUNTER — TRANSCRIPTION ENCOUNTER (OUTPATIENT)
Age: 63
End: 2018-04-25

## 2018-04-25 LAB
ANION GAP SERPL CALC-SCNC: 11 MMOL/L — SIGNIFICANT CHANGE UP (ref 5–17)
BUN SERPL-MCNC: 17 MG/DL — SIGNIFICANT CHANGE UP (ref 7–23)
CALCIUM SERPL-MCNC: 8.7 MG/DL — SIGNIFICANT CHANGE UP (ref 8.4–10.5)
CHLORIDE SERPL-SCNC: 97 MMOL/L — SIGNIFICANT CHANGE UP (ref 96–108)
CO2 SERPL-SCNC: 27 MMOL/L — SIGNIFICANT CHANGE UP (ref 22–31)
CREAT SERPL-MCNC: 0.98 MG/DL — SIGNIFICANT CHANGE UP (ref 0.5–1.3)
GLUCOSE SERPL-MCNC: 123 MG/DL — HIGH (ref 70–99)
GRAM STN FLD: SIGNIFICANT CHANGE UP
HCT VFR BLD CALC: 39.7 % — SIGNIFICANT CHANGE UP (ref 39–50)
HGB BLD-MCNC: 13.2 G/DL — SIGNIFICANT CHANGE UP (ref 13–17)
MCHC RBC-ENTMCNC: 28 PG — SIGNIFICANT CHANGE UP (ref 27–34)
MCHC RBC-ENTMCNC: 33.2 G/DL — SIGNIFICANT CHANGE UP (ref 32–36)
MCV RBC AUTO: 84.3 FL — SIGNIFICANT CHANGE UP (ref 80–100)
PLATELET # BLD AUTO: 254 K/UL — SIGNIFICANT CHANGE UP (ref 150–400)
POTASSIUM SERPL-MCNC: 4.2 MMOL/L — SIGNIFICANT CHANGE UP (ref 3.5–5.3)
POTASSIUM SERPL-SCNC: 4.2 MMOL/L — SIGNIFICANT CHANGE UP (ref 3.5–5.3)
RBC # BLD: 4.71 M/UL — SIGNIFICANT CHANGE UP (ref 4.2–5.8)
RBC # FLD: 14.6 % — SIGNIFICANT CHANGE UP (ref 10.3–16.9)
SODIUM SERPL-SCNC: 135 MMOL/L — SIGNIFICANT CHANGE UP (ref 135–145)
SPECIMEN SOURCE: SIGNIFICANT CHANGE UP
SURGICAL PATHOLOGY STUDY: SIGNIFICANT CHANGE UP
WBC # BLD: 16.6 K/UL — HIGH (ref 3.8–10.5)
WBC # FLD AUTO: 16.6 K/UL — HIGH (ref 3.8–10.5)

## 2018-04-25 RX ADMIN — OXYCODONE HYDROCHLORIDE 10 MILLIGRAM(S): 5 TABLET ORAL at 01:00

## 2018-04-25 RX ADMIN — Medication 100 MILLIGRAM(S): at 06:23

## 2018-04-25 RX ADMIN — HYDROMORPHONE HYDROCHLORIDE 0.5 MILLIGRAM(S): 2 INJECTION INTRAMUSCULAR; INTRAVENOUS; SUBCUTANEOUS at 16:30

## 2018-04-25 RX ADMIN — Medication 1000 MILLIGRAM(S): at 08:00

## 2018-04-25 RX ADMIN — Medication 650 MILLIGRAM(S): at 13:31

## 2018-04-25 RX ADMIN — LOSARTAN POTASSIUM 50 MILLIGRAM(S): 100 TABLET, FILM COATED ORAL at 06:23

## 2018-04-25 RX ADMIN — Medication 100 MILLIGRAM(S): at 13:31

## 2018-04-25 RX ADMIN — Medication 400 MILLIGRAM(S): at 07:40

## 2018-04-25 RX ADMIN — HYDROMORPHONE HYDROCHLORIDE 0.5 MILLIGRAM(S): 2 INJECTION INTRAMUSCULAR; INTRAVENOUS; SUBCUTANEOUS at 08:00

## 2018-04-25 RX ADMIN — CELECOXIB 200 MILLIGRAM(S): 200 CAPSULE ORAL at 10:14

## 2018-04-25 RX ADMIN — Medication 50 MILLIGRAM(S): at 04:11

## 2018-04-25 RX ADMIN — OXYCODONE HYDROCHLORIDE 10 MILLIGRAM(S): 5 TABLET ORAL at 13:32

## 2018-04-25 RX ADMIN — OXYCODONE HYDROCHLORIDE 10 MILLIGRAM(S): 5 TABLET ORAL at 18:40

## 2018-04-25 RX ADMIN — OXYCODONE HYDROCHLORIDE 20 MILLIGRAM(S): 5 TABLET ORAL at 22:45

## 2018-04-25 RX ADMIN — OXYCODONE HYDROCHLORIDE 10 MILLIGRAM(S): 5 TABLET ORAL at 18:07

## 2018-04-25 RX ADMIN — SENNA PLUS 2 TABLET(S): 8.6 TABLET ORAL at 21:57

## 2018-04-25 RX ADMIN — HYDROMORPHONE HYDROCHLORIDE 0.5 MILLIGRAM(S): 2 INJECTION INTRAMUSCULAR; INTRAVENOUS; SUBCUTANEOUS at 10:16

## 2018-04-25 RX ADMIN — POLYETHYLENE GLYCOL 3350 17 GRAM(S): 17 POWDER, FOR SOLUTION ORAL at 10:15

## 2018-04-25 RX ADMIN — OXYCODONE HYDROCHLORIDE 10 MILLIGRAM(S): 5 TABLET ORAL at 04:00

## 2018-04-25 RX ADMIN — HYDROMORPHONE HYDROCHLORIDE 0.5 MILLIGRAM(S): 2 INJECTION INTRAMUSCULAR; INTRAVENOUS; SUBCUTANEOUS at 10:30

## 2018-04-25 RX ADMIN — Medication 300 MILLIGRAM(S): at 01:30

## 2018-04-25 RX ADMIN — OXYCODONE HYDROCHLORIDE 10 MILLIGRAM(S): 5 TABLET ORAL at 00:00

## 2018-04-25 RX ADMIN — HYDROMORPHONE HYDROCHLORIDE 0.5 MILLIGRAM(S): 2 INJECTION INTRAMUSCULAR; INTRAVENOUS; SUBCUTANEOUS at 07:40

## 2018-04-25 RX ADMIN — OXYCODONE HYDROCHLORIDE 10 MILLIGRAM(S): 5 TABLET ORAL at 09:38

## 2018-04-25 RX ADMIN — HYDROMORPHONE HYDROCHLORIDE 0.5 MILLIGRAM(S): 2 INJECTION INTRAMUSCULAR; INTRAVENOUS; SUBCUTANEOUS at 16:12

## 2018-04-25 RX ADMIN — OXYCODONE HYDROCHLORIDE 20 MILLIGRAM(S): 5 TABLET ORAL at 10:14

## 2018-04-25 RX ADMIN — Medication 650 MILLIGRAM(S): at 21:58

## 2018-04-25 RX ADMIN — OXYCODONE HYDROCHLORIDE 10 MILLIGRAM(S): 5 TABLET ORAL at 22:45

## 2018-04-25 RX ADMIN — OXYCODONE HYDROCHLORIDE 20 MILLIGRAM(S): 5 TABLET ORAL at 10:45

## 2018-04-25 RX ADMIN — Medication 100 MILLIGRAM(S): at 21:58

## 2018-04-25 RX ADMIN — OXYCODONE HYDROCHLORIDE 10 MILLIGRAM(S): 5 TABLET ORAL at 05:00

## 2018-04-25 RX ADMIN — ENOXAPARIN SODIUM 40 MILLIGRAM(S): 100 INJECTION SUBCUTANEOUS at 06:23

## 2018-04-25 RX ADMIN — OXYCODONE HYDROCHLORIDE 10 MILLIGRAM(S): 5 TABLET ORAL at 09:08

## 2018-04-25 RX ADMIN — OXYCODONE HYDROCHLORIDE 20 MILLIGRAM(S): 5 TABLET ORAL at 21:58

## 2018-04-25 RX ADMIN — OXYCODONE HYDROCHLORIDE 10 MILLIGRAM(S): 5 TABLET ORAL at 21:58

## 2018-04-25 RX ADMIN — ESCITALOPRAM OXALATE 10 MILLIGRAM(S): 10 TABLET, FILM COATED ORAL at 10:14

## 2018-04-25 RX ADMIN — CELECOXIB 200 MILLIGRAM(S): 200 CAPSULE ORAL at 11:00

## 2018-04-25 RX ADMIN — OXYCODONE HYDROCHLORIDE 10 MILLIGRAM(S): 5 TABLET ORAL at 14:00

## 2018-04-25 NOTE — DISCHARGE NOTE ADULT - MEDICATION SUMMARY - MEDICATIONS TO TAKE
I will START or STAY ON the medications listed below when I get home from the hospital:    acetaminophen 325 mg oral tablet  -- 2 tab(s) by mouth every 6 hours, As needed, For Temp greater than 38 C (100.4 F) or mild pain 1-3  -- Indication: For Fever    Aspirin Enteric Coated 325 mg oral delayed release tablet  -- 1 tab(s) by mouth 2 times a day for 28 days. START AFTER COMPLETING LOVENOX INJECTIONS.  -- Swallow whole.  Do not crush.  Take with food or milk.    -- Indication: For DVT Prophylaxis    losartan 50 mg oral tablet  -- 1 tab(s) by mouth once a day  -- Indication: For Hypertension    enoxaparin 40 mg/0.4 mL injectable solution  -- 40 milligram(s) subcutaneously once a day for 12 more days. Last dose 5/8/18.  -- It is very important that you take or use this exactly as directed.  Do not skip doses or discontinue unless directed by your doctor.    -- Indication: For DVT Prophylaxis    escitalopram 10 mg oral tablet  -- 1 tab(s) by mouth once a day  -- Indication: For Depression    docusate sodium 100 mg oral capsule  -- 1 cap(s) by mouth 3 times a day  -- Indication: For Constipation    polyethylene glycol 3350 oral powder for reconstitution  -- 17 gram(s) by mouth once a day  -- Indication: For Constipation    senna oral tablet  -- 2 tab(s) by mouth once a day (at bedtime), As needed, Constipation  -- Indication: For Constipation

## 2018-04-25 NOTE — DISCHARGE NOTE ADULT - ADDITIONAL INSTRUCTIONS
No strenuous activity, heavy lifting, driving or returning to work until cleared by MD.  No soaking in bathtubs.  Remove dressing after post op day 5-7, then leave incision open to air. Keep incision clean and dry.  Try to have regular bowel movements, take stool softener or laxative if necessary.  May take Pepcid or Zantac for upset stomach.  Swelling may travel all the way down leg to foot, this is normal and will subside in a few weeks.  Call to schedule an appointment with Dr. Davila for follow up, if you have staples or sutures they will be removed in office.  Contact your doctor if you experience: fever greater than 101.5, chills, chest pain, difficulty breathing, redness or excessive drainage around the incision, other concerns.  Follow up with your primary care provider.    Please follow up with your Pain Management MD in [facility] [place] 4[place]30[place]18. No strenuous activity, heavy lifting, driving or returning to work until cleared by MD.  No soaking in bathtubs.  Remove dressing after post op day 5-7, then leave incision open to air. Keep incision clean and dry.  Try to have regular bowel movements, take stool softener or laxative if necessary.  May take Pepcid or Zantac for upset stomach.  Swelling may travel all the way down leg to foot, this is normal and will subside in a few weeks.  Call to schedule an appointment with Dr. Davila for follow up on Monday 4/30/18, if you have staples or sutures they will be removed in office.  Contact your doctor if you experience: fever greater than 101.5, chills, chest pain, difficulty breathing, redness or excessive drainage around the incision, other concerns.  Follow up with your primary care provider.    [place] [facility] [place]. No strenuous activity, heavy lifting, driving or returning to work until cleared by MD.  No soaking in bathtubs.  Remove dressing after post op day 5-7, then leave incision open to air. Keep incision clean and dry.  Try to have regular bowel movements, take stool softener or laxative if necessary.  May take Pepcid or Zantac for upset stomach.  Swelling may travel all the way down leg to foot, this is normal and will subside in a few weeks.  Call to schedule an appointment with Dr. Davila for follow up on Monday 4/30/18, if you have staples or sutures they will be removed in office.  Contact your doctor if you experience: fever greater than 101.5, chills, chest pain, difficulty breathing, redness or excessive drainage around the incision, other concerns.  Follow up with your primary care provider.    Please follow up with your Pain Management [place] [facility] [place].    For clot prevention please continue taking Lovenox 40mg (injection) for another 12 days. Upon completing this, please start taking Aspirin 325mg twice daily for 4 weeks.

## 2018-04-25 NOTE — DISCHARGE NOTE ADULT - CARE PLAN
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  Assessment and plan of treatment:	See below.

## 2018-04-25 NOTE — DISCHARGE NOTE ADULT - NS AS ACTIVITY OBS
Do not make important decisions/Do not drive or operate machinery/Bathing allowed/No Heavy lifting/straining Do not drive or operate machinery/Do not make important decisions/No Heavy lifting/straining

## 2018-04-25 NOTE — DISCHARGE NOTE ADULT - HOSPITAL COURSE
Admitted 4/24/18  Surgery Revision of Left TKA on 4/24/18  Kacey-op Antibiotics  Pain control  DVT prophylaxis  OOB/Physical Therapy

## 2018-04-25 NOTE — DISCHARGE NOTE ADULT - PATIENT PORTAL LINK FT
You can access the [facility] Patient Portal, offered by [facility], by registering with the following website: [email or web address]

## 2018-04-25 NOTE — DISCHARGE NOTE ADULT - PLAN OF CARE
Improvement Improvement of pain and ability to ambulate Improvement of pain and ability to ambulate. See below.

## 2018-04-25 NOTE — DISCHARGE NOTE ADULT - CARE PROVIDER_API CALL
Feroz Davila (MD), Orthopaedic Surgery  [address]  [address] [place] [address]  Phone: [phone]  Fax: [phone]

## 2018-04-26 VITALS — WEIGHT: 228.84 LBS

## 2018-04-26 LAB
ANION GAP SERPL CALC-SCNC: 12 MMOL/L — SIGNIFICANT CHANGE UP (ref 5–17)
BUN SERPL-MCNC: 13 MG/DL — SIGNIFICANT CHANGE UP (ref 7–23)
CALCIUM SERPL-MCNC: 8.6 MG/DL — SIGNIFICANT CHANGE UP (ref 8.4–10.5)
CHLORIDE SERPL-SCNC: 97 MMOL/L — SIGNIFICANT CHANGE UP (ref 96–108)
CO2 SERPL-SCNC: 25 MMOL/L — SIGNIFICANT CHANGE UP (ref 22–31)
CREAT SERPL-MCNC: 0.99 MG/DL — SIGNIFICANT CHANGE UP (ref 0.5–1.3)
GLUCOSE SERPL-MCNC: 122 MG/DL — HIGH (ref 70–99)
HCT VFR BLD CALC: 39.3 % — SIGNIFICANT CHANGE UP (ref 39–50)
HGB BLD-MCNC: 12.9 G/DL — LOW (ref 13–17)
MCHC RBC-ENTMCNC: 28.5 PG — SIGNIFICANT CHANGE UP (ref 27–34)
MCHC RBC-ENTMCNC: 32.8 G/DL — SIGNIFICANT CHANGE UP (ref 32–36)
MCV RBC AUTO: 86.9 FL — SIGNIFICANT CHANGE UP (ref 80–100)
PLATELET # BLD AUTO: 220 K/UL — SIGNIFICANT CHANGE UP (ref 150–400)
POTASSIUM SERPL-MCNC: 3.9 MMOL/L — SIGNIFICANT CHANGE UP (ref 3.5–5.3)
POTASSIUM SERPL-SCNC: 3.9 MMOL/L — SIGNIFICANT CHANGE UP (ref 3.5–5.3)
RBC # BLD: 4.52 M/UL — SIGNIFICANT CHANGE UP (ref 4.2–5.8)
RBC # FLD: 15.1 % — SIGNIFICANT CHANGE UP (ref 10.3–16.9)
SODIUM SERPL-SCNC: 134 MMOL/L — LOW (ref 135–145)
WBC # BLD: 11.3 K/UL — HIGH (ref 3.8–10.5)
WBC # FLD AUTO: 11.3 K/UL — HIGH (ref 3.8–10.5)

## 2018-04-26 RX ORDER — OXYCODONE HYDROCHLORIDE 5 MG/1
1 TABLET ORAL
Qty: 25 | Refills: 0
Start: 2018-04-26 | End: 2018-04-28

## 2018-04-26 RX ORDER — MELOXICAM 15 MG/1
1 TABLET ORAL
Qty: 5 | Refills: 0
Start: 2018-04-26 | End: 2018-04-30

## 2018-04-26 RX ORDER — SENNA PLUS 8.6 MG/1
2 TABLET ORAL
Qty: 0 | Refills: 0 | DISCHARGE
Start: 2018-04-26

## 2018-04-26 RX ORDER — ASPIRIN/CALCIUM CARB/MAGNESIUM 324 MG
1 TABLET ORAL
Qty: 56 | Refills: 0
Start: 2018-04-26 | End: 2018-05-23

## 2018-04-26 RX ORDER — POLYETHYLENE GLYCOL 3350 17 G/17G
17 POWDER, FOR SOLUTION ORAL
Qty: 0 | Refills: 0 | DISCHARGE
Start: 2018-04-26

## 2018-04-26 RX ORDER — DOCUSATE SODIUM 100 MG
1 CAPSULE ORAL
Qty: 0 | Refills: 0 | DISCHARGE
Start: 2018-04-26

## 2018-04-26 RX ORDER — OXYCODONE HYDROCHLORIDE 5 MG/1
1 TABLET ORAL
Qty: 10 | Refills: 0
Start: 2018-04-26 | End: 2018-04-30

## 2018-04-26 RX ORDER — ACETAMINOPHEN 500 MG
2 TABLET ORAL
Qty: 0 | Refills: 0 | DISCHARGE
Start: 2018-04-26

## 2018-04-26 RX ADMIN — OXYCODONE HYDROCHLORIDE 10 MILLIGRAM(S): 5 TABLET ORAL at 11:28

## 2018-04-26 RX ADMIN — Medication 100 MILLIGRAM(S): at 05:33

## 2018-04-26 RX ADMIN — LOSARTAN POTASSIUM 50 MILLIGRAM(S): 100 TABLET, FILM COATED ORAL at 05:33

## 2018-04-26 RX ADMIN — OXYCODONE HYDROCHLORIDE 10 MILLIGRAM(S): 5 TABLET ORAL at 03:00

## 2018-04-26 RX ADMIN — CELECOXIB 200 MILLIGRAM(S): 200 CAPSULE ORAL at 08:37

## 2018-04-26 RX ADMIN — OXYCODONE HYDROCHLORIDE 10 MILLIGRAM(S): 5 TABLET ORAL at 07:55

## 2018-04-26 RX ADMIN — OXYCODONE HYDROCHLORIDE 10 MILLIGRAM(S): 5 TABLET ORAL at 07:16

## 2018-04-26 RX ADMIN — OXYCODONE HYDROCHLORIDE 20 MILLIGRAM(S): 5 TABLET ORAL at 10:13

## 2018-04-26 RX ADMIN — Medication 650 MILLIGRAM(S): at 07:16

## 2018-04-26 RX ADMIN — OXYCODONE HYDROCHLORIDE 10 MILLIGRAM(S): 5 TABLET ORAL at 15:26

## 2018-04-26 RX ADMIN — ENOXAPARIN SODIUM 40 MILLIGRAM(S): 100 INJECTION SUBCUTANEOUS at 05:33

## 2018-04-26 RX ADMIN — HYDROMORPHONE HYDROCHLORIDE 0.5 MILLIGRAM(S): 2 INJECTION INTRAMUSCULAR; INTRAVENOUS; SUBCUTANEOUS at 06:30

## 2018-04-26 RX ADMIN — Medication 650 MILLIGRAM(S): at 15:26

## 2018-04-26 RX ADMIN — ESCITALOPRAM OXALATE 10 MILLIGRAM(S): 10 TABLET, FILM COATED ORAL at 08:37

## 2018-04-26 RX ADMIN — HYDROMORPHONE HYDROCHLORIDE 0.5 MILLIGRAM(S): 2 INJECTION INTRAMUSCULAR; INTRAVENOUS; SUBCUTANEOUS at 05:33

## 2018-04-26 RX ADMIN — OXYCODONE HYDROCHLORIDE 10 MILLIGRAM(S): 5 TABLET ORAL at 02:10

## 2018-04-26 NOTE — DIETITIAN INITIAL EVALUATION ADULT. - ENERGY NEEDS
Height: 5'11" Weight: 229lbs, IBW 172lbs+/-10%, %%, BMI 32  IBW used for calculations as pt >120% of IBW   Nutrient needs based on [facility] standards of care for maintenance in adults.   Needs adjusted for post-op

## 2018-04-26 NOTE — DIETITIAN INITIAL EVALUATION ADULT. - OTHER INFO
62yo M s/p Left Rev TKA POD # 2. Currently on regular diet and tolerating PO. Denies GI distress. Has not had a BM since prior to Sx, denies bloating or distension. Eating <50% meals- pt reports he will eat more once he is in his own home. Discussed prioritizing protein options. Allergic to seafood, no other dietary restrictions. Skin: surgical incision; GI WDL per flowsheet

## 2018-04-26 NOTE — PROGRESS NOTE ADULT - SUBJECTIVE AND OBJECTIVE BOX
HPI:  63 year old white male with osteoarthritis left knee s/p left TKR about 6 years ago and revision 2/12 years later.  Patient now with 1 1/2 years of moderate left knee pain, deformity decreased ROM and unsteady gait.  X rays consistent with failed left TKR.   Symptoms worse with stairs and after prolonged imobility.  Patient day #1 post op revision of left TKR with moderate left knee pain and malaise.    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      MEDICATIONS  (STANDING):  acetaminophen  IVPB. 1000 milliGRAM(s) IV Intermittent once  BUpivacaine liposome 1.3% Injectable (no eMAR) 20 milliLiter(s) Local Injection once  celecoxib 200 milliGRAM(s) Oral daily  diphenhydrAMINE   Injectable 50 milliGRAM(s) IV Push once  docusate sodium 100 milliGRAM(s) Oral three times a day  enoxaparin Injectable 40 milliGRAM(s) SubCutaneous every 24 hours  escitalopram 10 milliGRAM(s) Oral daily  lactated ringers. 1000 milliLiter(s) (70 mL/Hr) IV Continuous <Continuous>  losartan 50 milliGRAM(s) Oral daily  oxyCODONE  ER Tablet 20 milliGRAM(s) Oral every 12 hours  polyethylene glycol 3350 17 Gram(s) Oral daily    MEDICATIONS  (PRN):  acetaminophen   Tablet 650 milliGRAM(s) Oral every 6 hours PRN For Temp greater than 38 C (100.4 F) or mild pain 1-3  aluminum hydroxide/magnesium hydroxide/simethicone Suspension 30 milliLiter(s) Oral four times a day PRN Indigestion  HYDROmorphone  Injectable 0.5 milliGRAM(s) IV Push every 2 hours PRN breakthrough pain  magnesium hydroxide Suspension 30 milliLiter(s) Oral daily PRN Constipation  ondansetron Injectable 4 milliGRAM(s) IV Push every 6 hours PRN Nausea and/or Vomiting  oxyCODONE    IR 10 milliGRAM(s) Oral every 4 hours PRN Severe Pain (7 - 10)  oxyCODONE    IR 5 milliGRAM(s) Oral every 4 hours PRN Moderate Pain (4 - 6)  senna 2 Tablet(s) Oral at bedtime PRN Constipation      Allergies    penicillin (Other)  shellfish (Short breath; Swelling)    REVIEW OF SYSTEMS    General:  malaise	  Skin/Breast: normal  Ophthalmologic: negative  ENMT:	normal  Respiratory and Thorax: normal  Cardiovascular:	normal  Gastrointestinal:	normal  Genitourinary:	normal  Musculoskeletal:	 left knee swelling   Neurological:	normal  Psychiatric:	normal  Hematology/Lymphatics:	 negative  Endocrine:	negative  Allergic/Immunologic:	negative      PHYSICAL EXAM:     Vital Signs Last 24 Hrs  T(C): 36.3 (24 Apr 2018 21:26), Max: 36.3 (24 Apr 2018 20:45)  T(F): 97.3 (24 Apr 2018 21:26), Max: 97.3 (24 Apr 2018 20:45)  HR: 77 (24 Apr 2018 21:26) (54 - 78)  BP: 164/80 (24 Apr 2018 21:26) (129/74 - 210/97)  BP(mean): 139 (24 Apr 2018 19:30) (94 - 139)  RR: 16 (24 Apr 2018 21:26) (8 - 21)  SpO2: 96% (24 Apr 2018 21:26) (95% - 100%)    Constitutional: WDWNM  Eyes: conj pale  ENMT: negative  Neck: supple  Breasts: not examined   Back: negative  Respiratory: clear to P&A  Cardiovascular: no MRGT or H  Gastrointestinal: normal bowel sounds  Genitourinary: neg  Rectal: not examined  Extremities: normal  Vascular: normal  Neurological: normal  Skin: negative  Lymph Nodes: negative  Musculoskeletal:   decreased ROM  left knee  Psychiatric: anxiety                       13.4   11.4  )-----------( 234      ( 24 Apr 2018 17:46 )             40.4
ORTHO NOTE    [x] Pt seen/examined.  [ ] Pt without any complaints/in NAD.    [x] Pt complains of: Pain (9/10) in LLE.       ROS: [ ] Fever  [ ] Chills  [ ] CP [ ] SOB [ ] Dysnea  [ ] Palpitations [ ] Cough [ ] N/V/C/D [ ] Paresthia [ ] Other     [x] ROS  otherwise negative        PHYSICAL EXAM:    Vital Signs Last 24 Hrs  T(C): 37.5 (25 Apr 2018 13:28), Max: 37.5 (25 Apr 2018 13:28)  T(F): 99.5 (25 Apr 2018 13:28), Max: 99.5 (25 Apr 2018 13:28)  HR: 79 (25 Apr 2018 13:28) (54 - 79)  BP: 141/69 (25 Apr 2018 13:28) (127/70 - 210/97)  BP(mean): 139 (24 Apr 2018 19:30) (94 - 139)  RR: 18 (25 Apr 2018 13:28) (8 - 21)  SpO2: 96% (25 Apr 2018 13:28) (95% - 100%)    I&O's Detail    24 Apr 2018 07:01  -  25 Apr 2018 07:00  --------------------------------------------------------  IN:    lactated ringers.: 140 mL  Total IN: 140 mL    OUT:    Accordian: 490 mL    Indwelling Catheter - Urethral: 1050 mL    Voided: 700 mL  Total OUT: 2240 mL    Total NET: -2100 mL      25 Apr 2018 07:01  -  25 Apr 2018 15:33  --------------------------------------------------------  IN:  Total IN: 0 mL    OUT:    Accordian: 220 mL    Voided: 700 mL  Total OUT: 920 mL    Total NET: -920 mL           CAPILLARY BLOOD GLUCOSE                      Neuro: A+Ox3     Ext:  L knee dsg with ACE bandage c/d/i  Strength 5/5 GS, TA, FHL, EHL  SILT  WWP      LABS   25 Apr 2018 07:17    135    |  97     |  17     ----------------------------<  123    4.2     |  27     |  0.98     Ca    8.7        25 Apr 2018 07:17                                   13.2   16.6  )-----------( 254      ( 25 Apr 2018 07:17 )             39.7                 [ ] Other Labs  [ ] None ordered            Please check or [place] when present:  •  Heart Failure:    [ ] Acute        [ ]  Acute on Chronic        [ ] Chronic         [ ] Diastolic     [ ]  Combined    •  DAMARIS:     [ ] ATN        [ ]  Renal medullary necrosis       [ ]  Renal cortical necrosis                  [ ] Other pathological Lesion:  •  CKD:  [ ] Stage I   [ ] Stage II  [ ] Stage III    [ ]Stage IV   [ ]  CKD V   [ ]  Other/Unspecified:    •  Abdominal Nutritional Status:   [ ] Malnutrition-See Nutrition note    [ ] Cachexia   [ ]  Other        [ ] Supplement ordered:            [ ] Morbid Obesity: BMI >=40         ASSESSMENT/PLAN: 63y  Male s/p Left Rev  TKA POD # 1     CONTINUE:          [x] PT- WBAT    [x] DVT PPX- Lovenox 40mg daily    [x] Pain Mgt - Pain regimen adjusted    [x] F/u OR cultures - NGTD    [x] Dispo plan- Pending
ORTHO NOTE    [x] Pt seen/examined.  [ ] Pt without any complaints/in NAD.    [x] Pt complains of: Tolerable pain (5/10) in LLE while resting.       ROS: [ ] Fever  [ ] Chills  [ ] CP [ ] SOB [ ] Dysnea  [ ] Palpitations [ ] Cough [ ] N/V/C/D [ ] Paresthia [ ] Other     [x] ROS  otherwise negative        PHYSICAL EXAM:    Vital Signs Last 24 Hrs  T(C): 37 (26 Apr 2018 08:23), Max: 37.5 (25 Apr 2018 13:28)  T(F): 98.6 (26 Apr 2018 08:23), Max: 99.5 (25 Apr 2018 13:28)  HR: 84 (26 Apr 2018 09:55) (62 - 84)  BP: 173/82 (26 Apr 2018 09:55) (141/69 - 173/82)  BP(mean): --  RR: 16 (26 Apr 2018 08:23) (16 - 18)  SpO2: 98% (26 Apr 2018 09:55) (93% - 98%)    I&O's Detail    25 Apr 2018 07:01  -  26 Apr 2018 07:00  --------------------------------------------------------  IN:    Oral Fluid: 840 mL  Total IN: 840 mL    OUT:    Accordian: 220 mL    Voided: 2560 mL  Total OUT: 2780 mL    Total NET: -1940 mL      26 Apr 2018 07:01  -  26 Apr 2018 11:46  --------------------------------------------------------  IN:  Total IN: 0 mL    OUT:    Voided: 300 mL  Total OUT: 300 mL    Total NET: -300 mL           CAPILLARY BLOOD GLUCOSE    Neuro: A+Ox3     Ext: L knee with Aquacel dsg c/d/i  Strength 5/5 GS, TA, FHL, EHL  SILT  WWP                      LABS   26 Apr 2018 06:38    134    |  97     |  13     ----------------------------<  122    3.9     |  25     |  0.99     Ca    8.6        26 Apr 2018 06:38                                   12.9   11.3  )-----------( 220      ( 26 Apr 2018 06:38 )             39.3                 [ ] Other Labs  [ ] None ordered            Please check or [place] when present:  •  Heart Failure:    [ ] Acute        [ ]  Acute on Chronic        [ ] Chronic         [ ] Diastolic     [ ]  Combined    •  DAMARIS:     [ ] ATN        [ ]  Renal medullary necrosis       [ ]  Renal cortical necrosis                  [ ] Other pathological Lesion:  •  CKD:  [ ] Stage I   [ ] Stage II  [ ] Stage III    [ ]Stage IV   [ ]  CKD V   [ ]  Other/Unspecified:    •  Abdominal Nutritional Status:   [ ] Malnutrition-See Nutrition note    [ ] Cachexia   [ ]  Other        [ ] Supplement ordered:            [ ] Morbid Obesity: BMI >=40         ASSESSMENT/PLAN:  63y  Male s/p Left Rev  TKA POD # 2    CONTINUE:          [x] PT- WBAT    [x] DVT PPX- Lovenox 40mg daily    [x] Pain Mgt     [x] F/u OR cultures - NGTD    [x] Dispo plan- Home with PT
POST OPERATIVE DAY #:  0  STATUS POST:   Left rev TKA                         SUBJECTIVE: Patient seen and examined. Recovering from regional anesthesia       Pain Management:   Pain Controlled Analgesia        OBJECTIVE:          Affected extremity:          Dressing:  clean/dry/intact            warm well perfused; capillary refill <3 seconds     LABS:                MEDICATIONS:BUpivacaine liposome 1.3% Injectable (no eMAR) 20 milliLiter(s) Local Injection once  diphenhydrAMINE   Capsule 50 milliGRAM(s) Oral once    Anticoagulation:      Antibiotics:   Vancomycin    Pain medications:   diphenhydrAMINE   Capsule 50 milliGRAM(s) Oral once      RADIOLOGY & ADDITIONAL STUDIES:    A/P :   s/p  Left rev  TKA    POD # 0  -    Pain control  -    DVT ppx:  Lovenox   -    Periop abx:   Vanco (over 2 hrs starting at 2am and Benadryl one hour prior to administration)  -    ADAT  -    Check AM labs  -    Weight bearing status: WBAT    -    Resume home meds  -    Physical Therapy  -    Dispo:    To be determined
POST OPERATIVE DAY #:  2  STATUS POST: revision TKA                       SUBJECTIVE: Patient seen and examined. pain controlled         OBJECTIVE:     Vital Signs Last 24 Hrs  T(C): 37 (26 Apr 2018 08:23), Max: 37.5 (25 Apr 2018 13:28)  T(F): 98.6 (26 Apr 2018 08:23), Max: 99.5 (25 Apr 2018 13:28)  HR: 63 (26 Apr 2018 08:23) (62 - 79)  BP: 163/88 (26 Apr 2018 08:23) (141/69 - 171/87)  BP(mean): --  RR: 16 (26 Apr 2018 08:23) (16 - 18)  SpO2: 97% (26 Apr 2018 08:23) (93% - 99%)    Affected extremity:          Dressing: clean/dry/intact with no blood :           Sensation: intact to light touch :          Motor exam: 5/ 5 Tibialis Anterior/Gastrocnemius-Soleus          warm well perfused; capillary refill <3 seconds     LABS:                        12.9   11.3  )-----------( 220      ( 26 Apr 2018 06:38 )             39.3     04-26    134<L>  |  97  |  13  ----------------------------<  122<H>  3.9   |  25  |  0.99    Ca    8.6      26 Apr 2018 06:38            MEDICATIONS:acetaminophen   Tablet 650 milliGRAM(s) Oral every 6 hours PRN  aluminum hydroxide/magnesium hydroxide/simethicone Suspension 30 milliLiter(s) Oral four times a day PRN  bisacodyl Suppository 10 milliGRAM(s) Rectal daily PRN  BUpivacaine liposome 1.3% Injectable (no eMAR) 20 milliLiter(s) Local Injection once  celecoxib 200 milliGRAM(s) Oral daily  diphenhydrAMINE   Injectable 50 milliGRAM(s) IV Push once  docusate sodium 100 milliGRAM(s) Oral three times a day  enoxaparin Injectable 40 milliGRAM(s) SubCutaneous every 24 hours  escitalopram 10 milliGRAM(s) Oral daily  HYDROmorphone  Injectable 0.5 milliGRAM(s) IV Push every 2 hours PRN  losartan 50 milliGRAM(s) Oral daily  magnesium hydroxide Suspension 30 milliLiter(s) Oral daily PRN  ondansetron Injectable 4 milliGRAM(s) IV Push every 6 hours PRN  oxyCODONE    IR 10 milliGRAM(s) Oral every 4 hours PRN  oxyCODONE    IR 5 milliGRAM(s) Oral every 4 hours PRN  oxyCODONE  ER Tablet 20 milliGRAM(s) Oral every 12 hours  polyethylene glycol 3350 17 Gram(s) Oral daily  senna 2 Tablet(s) Oral at bedtime PRN    Anticoagulation:  enoxaparin Injectable 40 milliGRAM(s) SubCutaneous every 24 hours      Antibiotics:       Pain medications:   acetaminophen   Tablet 650 milliGRAM(s) Oral every 6 hours PRN  celecoxib 200 milliGRAM(s) Oral daily  escitalopram 10 milliGRAM(s) Oral daily  HYDROmorphone  Injectable 0.5 milliGRAM(s) IV Push every 2 hours PRN  ondansetron Injectable 4 milliGRAM(s) IV Push every 6 hours PRN  oxyCODONE    IR 10 milliGRAM(s) Oral every 4 hours PRN  oxyCODONE    IR 5 milliGRAM(s) Oral every 4 hours PRN  oxyCODONE  ER Tablet 20 milliGRAM(s) Oral every 12 hours        A/P :   s/prevision TKA   POD # 2  -    Pain control  -    DVT ppx:  Lovenox   -    Periop abx:  completed  -    Weight bearing status: WBAT -    Resume home meds  -    Physical Therapy  -    Dispo: Home
POST OPERATIVE DAY #: 1  STATUS POST:  Left Rev  TKA                       SUBJECTIVE: Patient seen and examined at bedside. Resting comfortably. c/o pain.    OBJECTIVE:     Vital Signs Last 24 Hrs  T(C): 36.8 (25 Apr 2018 05:20), Max: 36.8 (25 Apr 2018 05:20)  T(F): 98.3 (25 Apr 2018 05:20), Max: 98.3 (25 Apr 2018 05:20)  HR: 72 (25 Apr 2018 05:20) (54 - 78)  BP: 127/70 (25 Apr 2018 05:20) (127/70 - 210/97)  BP(mean): 139 (24 Apr 2018 19:30) (94 - 139)  RR: 16 (25 Apr 2018 05:20) (8 - 21)  SpO2: 98% (25 Apr 2018 05:20) (95% - 100%)    NAD  Left LE:          Drain in place, will continue to monitor output         Dressing: clean/dry/intact         Sensation: L2-S1 grossly intact to light touch         Motor exam: EHL/FHL/TA/GS intact          No calf tenderness         warm well perfused; capillary refill <3 seconds     LABS:                        13.4   11.4  )-----------( 234      ( 24 Apr 2018 17:46 )             40.4     Anticoagulation:  enoxaparin Injectable 40 milliGRAM(s) SubCutaneous every 24 hours        A/P : 63y  Male s/p Left Rev  TKA POD # 1  -    Analgesia  -    DVT ppx  -    f/u labs  -    Weight bearing status: WBAT  -    Physical Therapy  -    Dispo: To be determined  -    monitor drain output  -    f/u ORCx
HPI:  63 year old white male with osteoarthritis left knee s/p left TKR about 6 years ago and revision 2/12 years later.  Patient now with 1 1/2 years of moderate left knee pain, deformity decreased ROM and unsteady gait.  X rays consistent with failed left TKR.   Symptoms worse with stairs and after prolonged immobility.  Patient day #2 post op revision of left TKR with moderate left knee pain and malaise.    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      MEDICATIONS  (STANDING):  BUpivacaine liposome 1.3% Injectable (no eMAR) 20 milliLiter(s) Local Injection once  celecoxib 200 milliGRAM(s) Oral daily  diphenhydrAMINE   Injectable 50 milliGRAM(s) IV Push once  docusate sodium 100 milliGRAM(s) Oral three times a day  enoxaparin Injectable 40 milliGRAM(s) SubCutaneous every 24 hours  escitalopram 10 milliGRAM(s) Oral daily  losartan 50 milliGRAM(s) Oral daily  oxyCODONE  ER Tablet 20 milliGRAM(s) Oral every 12 hours  polyethylene glycol 3350 17 Gram(s) Oral daily    MEDICATIONS  (PRN):  acetaminophen   Tablet 650 milliGRAM(s) Oral every 6 hours PRN For Temp greater than 38 C (100.4 F) or mild pain 1-3  aluminum hydroxide/magnesium hydroxide/simethicone Suspension 30 milliLiter(s) Oral four times a day PRN Indigestion  bisacodyl Suppository 10 milliGRAM(s) Rectal daily PRN If no bowel movement by postoperative day #2  HYDROmorphone  Injectable 0.5 milliGRAM(s) IV Push every 2 hours PRN breakthrough pain  magnesium hydroxide Suspension 30 milliLiter(s) Oral daily PRN Constipation  ondansetron Injectable 4 milliGRAM(s) IV Push every 6 hours PRN Nausea and/or Vomiting  oxyCODONE    IR 10 milliGRAM(s) Oral every 4 hours PRN Severe Pain (7 - 10)  oxyCODONE    IR 5 milliGRAM(s) Oral every 4 hours PRN Moderate Pain (4 - 6)  senna 2 Tablet(s) Oral at bedtime PRN Constipation      Allergies    penicillin (Other)  shellfish (Short breath; Swelling)    REVIEW OF SYSTEMS    General:  malaise	  Skin/Breast: normal  Ophthalmologic: negative  ENMT:	normal  Respiratory and Thorax: normal  Cardiovascular:	normal  Gastrointestinal:	normal  Genitourinary:	normal  Musculoskeletal:	 left knee swelling   Neurological:	normal  Psychiatric:	normal  Hematology/Lymphatics:	 negative  Endocrine:	negative  Allergic/Immunologic:	negative      PHYSICAL EXAM:     Vital Signs Last 24 Hrs  T(C): 36.8 (26 Apr 2018 05:00), Max: 37.5 (25 Apr 2018 13:28)  T(F): 98.2 (26 Apr 2018 05:00), Max: 99.5 (25 Apr 2018 13:28)  HR: 67 (26 Apr 2018 05:00) (62 - 79)  BP: 159/88 (26 Apr 2018 05:00) (141/69 - 169/79)  BP(mean): --  RR: 18 (26 Apr 2018 05:00) (17 - 18)  SpO2: 97% (26 Apr 2018 05:00) (93% - 99%)    Constitutional: WDWNM  Eyes: conj pale  ENMT: negative  Neck: supple  Breasts: not examined   Back: negative  Respiratory: clear to P&A  Cardiovascular: no MRGT or H  Gastrointestinal: normal bowel sounds  Genitourinary: neg  Rectal: not examined  Extremities: normal  Vascular: normal  Neurological: normal  Skin: negative  Lymph Nodes: negative  Musculoskeletal:   decreased ROM  left knee  Psychiatric: anxiety                          13.2   16.6  )-----------( 254      ( 25 Apr 2018 07:17 )             39.7       04-25    135  |  97  |  17  ----------------------------<  123<H>  4.2   |  27  |  0.98    Ca    8.7      25 Apr 2018 07:17

## 2018-04-26 NOTE — PROGRESS NOTE ADULT - PROBLEM SELECTOR PROBLEM 1
Mechanical loosening of prosthetic knee, subsequent encounter
Mechanical loosening of prosthetic knee, subsequent encounter

## 2018-04-26 NOTE — PROVIDER CONTACT NOTE (OTHER) - ASSESSMENT
Pt requested tylenol 650mg and oxycodone 10mg stating those two medications combined help to relieve his pain. Pt is currently ordered oxycodone 5 and 10mg po.

## 2018-04-26 NOTE — PROGRESS NOTE ADULT - PROBLEM SELECTOR PLAN 1
Discussed with house staff and nursing staff.  Pain management, PT, incentive spirometer, DVT prophylaxis, repeat labs and discharge planning.
Discussed with house staff and nursing staff.  Pain management, PT, incentive spirometer, DVT prophylaxis, repeat labs and discharge planning.

## 2018-04-27 LAB
CULTURE RESULTS: NO GROWTH — SIGNIFICANT CHANGE UP
SPECIMEN SOURCE: SIGNIFICANT CHANGE UP

## 2018-04-27 PROCEDURE — 88331 PATH CONSLTJ SURG 1 BLK 1SPC: CPT

## 2018-04-27 PROCEDURE — 87075 CULTR BACTERIA EXCEPT BLOOD: CPT

## 2018-04-27 PROCEDURE — 87070 CULTURE OTHR SPECIMN AEROBIC: CPT

## 2018-04-27 PROCEDURE — C1889: CPT

## 2018-04-27 PROCEDURE — 97116 GAIT TRAINING THERAPY: CPT

## 2018-04-27 PROCEDURE — 36415 COLL VENOUS BLD VENIPUNCTURE: CPT

## 2018-04-27 PROCEDURE — 73560 X-RAY EXAM OF KNEE 1 OR 2: CPT

## 2018-04-27 PROCEDURE — 85025 COMPLETE CBC W/AUTO DIFF WBC: CPT

## 2018-04-27 PROCEDURE — 85027 COMPLETE CBC AUTOMATED: CPT

## 2018-04-27 PROCEDURE — 87205 SMEAR GRAM STAIN: CPT

## 2018-04-27 PROCEDURE — 97161 PT EVAL LOW COMPLEX 20 MIN: CPT

## 2018-04-27 PROCEDURE — 88305 TISSUE EXAM BY PATHOLOGIST: CPT

## 2018-04-27 PROCEDURE — 80048 BASIC METABOLIC PNL TOTAL CA: CPT

## 2018-04-27 PROCEDURE — C1776: CPT

## 2018-04-27 PROCEDURE — C1713: CPT

## 2018-04-27 RX ORDER — ENOXAPARIN SODIUM 100 MG/ML
40 INJECTION SUBCUTANEOUS
Qty: 0 | Refills: 0 | COMMUNITY
Start: 2018-04-27

## 2018-04-27 RX ORDER — ENOXAPARIN SODIUM 100 MG/ML
40 INJECTION SUBCUTANEOUS
Qty: 0 | Refills: 0 | COMMUNITY
Start: 2018-04-27 | End: 2018-05-08

## 2018-04-27 RX ORDER — ENOXAPARIN SODIUM 100 MG/ML
40 INJECTION SUBCUTANEOUS
Qty: 12 | Refills: 0
Start: 2018-04-27 | End: 2018-05-08

## 2018-04-30 ENCOUNTER — APPOINTMENT (OUTPATIENT)
Dept: ORTHOPEDIC SURGERY | Facility: CLINIC | Age: 63
End: 2018-04-30
Payer: COMMERCIAL

## 2018-04-30 PROCEDURE — 99024 POSTOP FOLLOW-UP VISIT: CPT

## 2018-05-01 DIAGNOSIS — I10 ESSENTIAL (PRIMARY) HYPERTENSION: ICD-10-CM

## 2018-05-01 DIAGNOSIS — Z96.651 PRESENCE OF RIGHT ARTIFICIAL KNEE JOINT: ICD-10-CM

## 2018-05-01 DIAGNOSIS — Z87.81 PERSONAL HISTORY OF (HEALED) TRAUMATIC FRACTURE: ICD-10-CM

## 2018-05-01 DIAGNOSIS — Z88.0 ALLERGY STATUS TO PENICILLIN: ICD-10-CM

## 2018-05-01 DIAGNOSIS — T84.023A INSTABILITY OF INTERNAL LEFT KNEE PROSTHESIS, INITIAL ENCOUNTER: ICD-10-CM

## 2018-05-01 DIAGNOSIS — F41.9 ANXIETY DISORDER, UNSPECIFIED: ICD-10-CM

## 2018-05-01 DIAGNOSIS — Z91.013 ALLERGY TO SEAFOOD: ICD-10-CM
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
## 2018-05-14 ENCOUNTER — APPOINTMENT (OUTPATIENT)
Dept: ORTHOPEDIC SURGERY | Facility: CLINIC | Age: 63
End: 2018-05-14
Payer: COMMERCIAL

## 2018-05-14 PROCEDURE — 99024 POSTOP FOLLOW-UP VISIT: CPT

## 2018-06-04 ENCOUNTER — APPOINTMENT (OUTPATIENT)
Dept: ORTHOPEDIC SURGERY | Facility: CLINIC | Age: 63
End: 2018-06-04
Payer: COMMERCIAL

## 2018-06-04 VITALS — BODY MASS INDEX: 29.8 KG/M2 | HEIGHT: 72 IN | WEIGHT: 220 LBS

## 2018-06-04 DIAGNOSIS — T84.033A MECHANICAL LOOSENING OF INTERNAL LEFT KNEE PROSTHETIC JOINT, INITIAL ENCOUNTER: ICD-10-CM

## 2018-06-04 PROCEDURE — 73562 X-RAY EXAM OF KNEE 3: CPT | Mod: LT

## 2018-06-04 PROCEDURE — 73560 X-RAY EXAM OF KNEE 1 OR 2: CPT | Mod: RT

## 2018-06-04 PROCEDURE — 99024 POSTOP FOLLOW-UP VISIT: CPT

## 2018-07-23 ENCOUNTER — FORM ENCOUNTER (OUTPATIENT)
Age: 63
End: 2018-07-23

## 2018-07-30 ENCOUNTER — APPOINTMENT (OUTPATIENT)
Dept: ORTHOPEDIC SURGERY | Facility: CLINIC | Age: 63
End: 2018-07-30
Payer: COMMERCIAL

## 2018-07-30 VITALS
HEART RATE: 60 BPM | DIASTOLIC BLOOD PRESSURE: 91 MMHG | WEIGHT: 220 LBS | SYSTOLIC BLOOD PRESSURE: 150 MMHG | HEIGHT: 72 IN | BODY MASS INDEX: 29.8 KG/M2

## 2018-07-30 PROBLEM — F41.9 ANXIETY DISORDER, UNSPECIFIED: Chronic | Status: ACTIVE | Noted: 2017-11-20

## 2018-07-30 PROBLEM — T14.8XXA OTHER INJURY OF UNSPECIFIED BODY REGION, INITIAL ENCOUNTER: Chronic | Status: ACTIVE | Noted: 2017-11-20

## 2018-07-30 PROCEDURE — 99213 OFFICE O/P EST LOW 20 MIN: CPT

## 2018-07-30 PROCEDURE — 73562 X-RAY EXAM OF KNEE 3: CPT | Mod: LT

## 2018-11-05 ENCOUNTER — APPOINTMENT (OUTPATIENT)
Dept: ORTHOPEDIC SURGERY | Facility: CLINIC | Age: 63
End: 2018-11-05
Payer: COMMERCIAL

## 2018-11-05 VITALS
DIASTOLIC BLOOD PRESSURE: 83 MMHG | HEART RATE: 76 BPM | WEIGHT: 220 LBS | OXYGEN SATURATION: 98 % | SYSTOLIC BLOOD PRESSURE: 154 MMHG | HEIGHT: 72 IN | BODY MASS INDEX: 29.8 KG/M2

## 2018-11-05 PROCEDURE — 99213 OFFICE O/P EST LOW 20 MIN: CPT

## 2018-11-05 PROCEDURE — 73562 X-RAY EXAM OF KNEE 3: CPT | Mod: LT

## 2019-04-29 NOTE — H&P PST ADULT - VENOUS THROMBOEMBOLISM FOR WOMEN ONLY
April 29, 2019     Lavern Harper  [address]  [place] [address]       Dear Lavern,    Welcome to [facility]! Our goal is to make care effective, proactive and convenient by using data you send us from home to better treat your chronic conditions.        My name is Socorro Meek, and I am your dedicated Digital Medicine clinician. As an expert in medication management, I will help ensure that the medications you are taking continue to provide the intended benefits and help you reach your goals. You can reach me directly at  [phone] or by sending me a message directly through your [facility] account.      I am Vinciio Valera and I will be your health . My job is to help you identify lifestyle changes to improve your disease control. We will talk about nutrition, exercise, and other ways you may be able to adjust your current habits to better your health. Additionally, we will help ensure you are completing the tests and screenings that are necessary to help manage your conditions. You can reach me directly at [phone]  or by sending me a message directly through your [facility] account.    Most importantly, YOU are at the center of this team. Together, we will work to improve your overall health and encourage you to meet your goals for a healthier lifestyle.     What we expect from YOU:  · Please take frequent home blood pressure measurements. We ask that you take at least 1 blood pressure reading per week, but more information will better help us get you know you. Be sure you rest for a few minutes before taking the reading in a quiet, comfortable place.     Be available to receive phone calls or [facility] messages, when appropriate, from your care team. Please let us know if there are any specific days or times that work best for us to reach you via phone.     Complete routine tests and screenings. Dont worry, we will help keep you on track!           What  you should expect from your Digital Medicine Care Team:   We will work with you to create a personalized plan of care and provide you with encouragement and education, including regarding lifestyle changes, that could help you manage your disease states.     We will adjust your current medications, if needed, and continue to monitor your long-term progress.     We will provide you and your physician with monthly progress reports after you have been in the program for more than 30 days.     We will send you reminders through [facility] and text messages to help ensure you do not miss any testing deadlines to help manage your disease states.    You will be able to reach us by phone or through your [facility] account by clicking our names under Care Team on the right side of the home screen.    I look forward to working with you to achieve your blood pressure goals!    We look forward to working with you to help manage your health,    Sincerely,    Your Digital Medicine Team    Please visit our websites to learn more:   · Hypertension: [email or web address]      Remember, we are not available for emergencies. If you have an emergency, please contact your doctors office directly or call [facility] on-call ([phone] or [phone]) or [phone].          not applicable

## 2019-05-06 ENCOUNTER — APPOINTMENT (OUTPATIENT)
Dept: ORTHOPEDIC SURGERY | Facility: CLINIC | Age: 64
End: 2019-05-06
Payer: COMMERCIAL

## 2019-05-06 VITALS
SYSTOLIC BLOOD PRESSURE: 153 MMHG | BODY MASS INDEX: 29.8 KG/M2 | HEIGHT: 72 IN | HEART RATE: 64 BPM | WEIGHT: 220 LBS | DIASTOLIC BLOOD PRESSURE: 94 MMHG

## 2019-05-06 PROCEDURE — 99213 OFFICE O/P EST LOW 20 MIN: CPT

## 2019-05-06 PROCEDURE — 73562 X-RAY EXAM OF KNEE 3: CPT | Mod: RT

## 2019-05-06 NOTE — ADDENDUM
[FreeTextEntry1] : This note was written by Ida Pichardo on 05/06/2019 acting as scribe for Dr. Feroz Davila M.D.\par \par I, Dr. Feroz Davila M.D., have read and attest that all the information, medical decision making and discharge instructions within are true and accurate.\par

## 2019-05-06 NOTE — HISTORY OF PRESENT ILLNESS
[de-identified] : 63 y/o male presents for follow up evaluation s/p bilateral revision TKR, left at 1 year and right at 1.5 years. Overall patient is doing well and feels he is improving. He continues to do PT and home exercise, and is happy with his range of motion. His main complaint is persistent bilateral shin pain. Patient uses extra strength Tylenol for pain with limited relief. He would like to discuss his symptoms at this time.

## 2019-05-06 NOTE — PHYSICAL EXAM
[de-identified] : Left Knee\par Inspection: no effusion\par Wounds: healed midline incision\par Alignment: normal.\par Palpation: no specific tenderness on palpation.\par ROM: Active (in degrees) 0-130\par Ligamentous laxity (neg): negative ant. drawer test, negative post. drawer test, stable to varus stress test, stable to valgus stress test,\par Patellofemoral Alignment Test: Q angle-, normal.\par Muscle Test: good quad strength.\par Leg examination: calf is soft and non-tender.\par \par Right Knee\par Inspection: no effusion\par Wounds: healed midline incision\par Alignment: normal.\par Palpation: no specific tenderness on palpation.\par ROM: Active (in degrees) 0-130\par Ligamentous laxity (neg): negative ant. drawer test, negative post. drawer test, stable to varus stress test, stable to valgus stress test,\par Patellofemoral Alignment Test: Q angle-, normal.\par Muscle Test: good quad strength.\par Leg examination: calf is soft and non-tender.  [de-identified] : Left knee AP, lateral, merchant view demonstrates a revision total knee in satisfactory position and alignment. No evidence of loosening. The patella sits in a central position, components well fixed.\par \par Right knee AP, lateral, merchant view demonstrates a revision total knee replacement in satisfactory position and alignment. No evidence of loosening. The patella sits in a central position.

## 2019-05-06 NOTE — DISCUSSION/SUMMARY
[de-identified] : Patient is doing well following their bilateral revision TKR, left at 1 year and right at 1.5 years. He continues to have some tip of stem pain, which I explained is due to the construct with significant proximal tibial bone loss and augmentation. I have reassured him that his implants are functioning well and there are no mechanical issues, and that his pain should subside over time since it has already to improve. We have provided a prescription for Mobic 7.5 QD. They can continue activities as tolerated. Patient may follow up with x-rays in 1 year, sooner if any acute problems.

## 2019-05-24 NOTE — PRE-OP CHECKLIST - WARM FLUIDS/WARM BLANKETS
Last seen: 4/30/19  RTC: 4 weeks  Cancel: 5/14/19 (provider said visit is not needed)  No-show: none  Next appt: 6/4/19     Incoming refill from pt's father via phone call as well     Medication requested: venlafaxine (EFFEXOR-XR) 150 MG 24 hr capsule  Directions: Take 2 capsules (300 mg) by mouth daily  Qty: 60  Last refilled: approximately 4/5/19     Medication refill approved per refill protocol  30 d/s sent to pt's preferred pharmacy   no

## 2019-09-16 ENCOUNTER — APPOINTMENT (OUTPATIENT)
Dept: ORTHOPEDIC SURGERY | Facility: CLINIC | Age: 64
End: 2019-09-16
Payer: COMMERCIAL

## 2019-09-16 VITALS — BODY MASS INDEX: 29.8 KG/M2 | HEIGHT: 72 IN | WEIGHT: 220 LBS

## 2019-09-16 PROCEDURE — 73560 X-RAY EXAM OF KNEE 1 OR 2: CPT | Mod: LT

## 2019-09-16 PROCEDURE — 99213 OFFICE O/P EST LOW 20 MIN: CPT

## 2019-09-16 PROCEDURE — 73562 X-RAY EXAM OF KNEE 3: CPT | Mod: RT

## 2019-09-16 NOTE — ADDENDUM
[FreeTextEntry1] : This note was written by Ida Pichardo on 09/16/2019 acting as scribe for Dr. Feroz Davila M.D.\par \par I, Dr. Feroz Davila M.D., have read and attest that all the information, medical decision making and discharge instructions within are true and accurate.\par

## 2019-09-16 NOTE — PHYSICAL EXAM
[de-identified] : Right Knee\par Inspection: no effusion\par Wounds: healed midline incision\par Alignment: normal.\par Palpation: no specific tenderness on palpation.\par ROM: Active (in degrees) 0-130\par Ligamentous laxity (neg): negative ant. drawer test, negative post. drawer test, stable to varus stress test, stable to valgus stress test,\par Patellofemoral Alignment Test: Q angle-, normal.\par Muscle Test: good quad strength.\par Leg examination: calf is soft and non-tender.  [de-identified] : Left knee xray merchant view taken at the office today demonstrates a total knee replacement in satisfactory position and alignment with the patella in a central position \par \par Right knee AP, lateral, merchant view demonstrates a revision total knee replacement in satisfactory position and alignment. No evidence of loosening. The patella sits in a central position.

## 2019-09-16 NOTE — DISCUSSION/SUMMARY
[de-identified] : Patient is overall doing well following their right revision TKR at 2 years. I explained that his symptoms are muscular in nature secondary to a possible reactional tendonitis due to his increase in activities. He may continue with modification of activities. I reviewed x-ray films with them and I have reassured him that his implants are functioning well. They can continue activities as tolerated. Patient may follow up with x-rays in December for an annual check.

## 2019-09-16 NOTE — HISTORY OF PRESENT ILLNESS
[de-identified] : 63 y/o male presents for follow up evaluation s/p right revision TKR at 2 years. Patient returns early, as his right knee has been painful around the proximal tibia for the past few weeks. Patient denies any specific injury. He reports that he has decreased his activities and work load over the past week and a half, which has improved his symptoms.  He continues to take Mobic prn with help. Patient states he can now walk for 1 mile and do normal daily activities with lessening discomfort at this time, but would like to make sure his implants are stable. He does continue to work with a .

## 2020-01-06 ENCOUNTER — APPOINTMENT (OUTPATIENT)
Dept: ORTHOPEDIC SURGERY | Facility: CLINIC | Age: 65
End: 2020-01-06
Payer: COMMERCIAL

## 2020-01-06 PROCEDURE — 73562 X-RAY EXAM OF KNEE 3: CPT | Mod: RT

## 2020-01-06 PROCEDURE — 99213 OFFICE O/P EST LOW 20 MIN: CPT

## 2020-01-06 NOTE — DISCUSSION/SUMMARY
[de-identified] : Patient is overall doing well following their right revision TKR at 2 years and left revision TKR at 1.5 years. I explained that his symptoms are muscular in nature secondary to a possible reactional tendonitis due to his increase in activities. I have reassured him that there are no mechanical issues. Patient may follow up with x-rays in 1 year. I also discussed possible use of orthotics and I have referred him to Dr. Kim and Dr. Hurtado for a shoulder evaluation. \par

## 2020-01-06 NOTE — ADDENDUM
[FreeTextEntry1] : This note was written by Cecilia Alvarado on 01/06/2020 acting as scribe for Dr. Feroz Davila M.D.\par \par I, Dr. Feroz Davila M.D., have read and attest that all the information, medical decision making and discharge instructions within are true and accurate.

## 2020-01-06 NOTE — HISTORY OF PRESENT ILLNESS
[de-identified] : 65 y/o male presents for follow up evaluation s/p right revision TKR at 2 years and 1.5y left revision TKR. Patient reports some right knee soreness at times when he overdoes it. He reports a delicate balance between exercise and rest. Patient takes Meloxicam for relief.

## 2020-01-06 NOTE — PHYSICAL EXAM
[de-identified] : Left Knee\par Inspection: no effusion\par Wounds: healed midline incision\par Alignment: normal.\par Palpation: no specific tenderness on palpation.\par ROM: Active (in degrees) 0-130\par Ligamentous laxity (neg): negative ant. drawer test, negative post. drawer test, stable to varus stress test, stable to valgus stress test,\par Patellofemoral Alignment Test: Q angle-, normal.\par Muscle Test: good quad strength.\par Leg examination: calf is soft and non-tender. \par \par Right Knee\par Inspection: no effusion\par Wounds: healed midline incision\par Alignment: normal.\par Palpation: no specific tenderness on palpation.\par ROM: Active (in degrees) 0-130\par Ligamentous laxity (neg): negative ant. drawer test, negative post. drawer test, stable to varus stress test, stable to valgus stress test,\par Patellofemoral Alignment Test: Q angle-, normal.\par Muscle Test: good quad strength.\par Leg examination: calf is soft and non-tender.  [de-identified] : Left knee AP, lateral, merchant view demonstrates a revision total knee replacement in satisfactory position and alignment. No evidence of loosening. The patella sits in a central position. \par \par Right knee AP, lateral, merchant view demonstrates a revision total knee replacement in satisfactory position and alignment. No evidence of loosening. The patella sits in a central position.

## 2020-02-06 ENCOUNTER — FORM ENCOUNTER (OUTPATIENT)
Age: 65
End: 2020-02-06

## 2020-02-07 ENCOUNTER — APPOINTMENT (OUTPATIENT)
Dept: RADIOLOGY | Facility: CLINIC | Age: 65
End: 2020-02-07

## 2020-02-07 ENCOUNTER — APPOINTMENT (OUTPATIENT)
Dept: ORTHOPEDIC SURGERY | Facility: CLINIC | Age: 65
End: 2020-02-07
Payer: COMMERCIAL

## 2020-02-07 ENCOUNTER — OUTPATIENT (OUTPATIENT)
Dept: OUTPATIENT SERVICES | Facility: HOSPITAL | Age: 65
LOS: 1 days | End: 2020-02-07
Payer: COMMERCIAL

## 2020-02-07 VITALS — HEIGHT: 72 IN | BODY MASS INDEX: 28.44 KG/M2 | WEIGHT: 210 LBS

## 2020-02-07 DIAGNOSIS — Z98.890 OTHER SPECIFIED POSTPROCEDURAL STATES: Chronic | ICD-10-CM

## 2020-02-07 DIAGNOSIS — Z96.651 PRESENCE OF RIGHT ARTIFICIAL KNEE JOINT: Chronic | ICD-10-CM

## 2020-02-07 DIAGNOSIS — Z96.652 PRESENCE OF LEFT ARTIFICIAL KNEE JOINT: Chronic | ICD-10-CM

## 2020-02-07 PROCEDURE — 73030 X-RAY EXAM OF SHOULDER: CPT | Mod: 26,RT

## 2020-02-07 PROCEDURE — 99204 OFFICE O/P NEW MOD 45 MIN: CPT

## 2020-02-07 RX ORDER — MORPHINE SULFATE 15 MG/1
15 TABLET, FILM COATED, EXTENDED RELEASE ORAL
Qty: 30 | Refills: 0 | Status: DISCONTINUED | COMMUNITY
Start: 2017-12-14 | End: 2020-02-07

## 2020-02-07 RX ORDER — ENOXAPARIN SODIUM 100 MG/ML
40 INJECTION SUBCUTANEOUS
Qty: 5 | Refills: 0 | Status: DISCONTINUED | COMMUNITY
Start: 2017-12-07 | End: 2020-02-07

## 2020-02-07 RX ORDER — ESCITALOPRAM OXALATE 10 MG/1
10 TABLET ORAL
Refills: 0 | Status: DISCONTINUED | COMMUNITY
End: 2020-02-07

## 2020-02-07 RX ORDER — ECONAZOLE NITRATE 10 MG/G
1 CREAM TOPICAL
Qty: 60 | Refills: 0 | Status: DISCONTINUED | COMMUNITY
Start: 2019-09-09

## 2020-02-07 RX ORDER — OXYCODONE HYDROCHLORIDE 15 MG/1
15 TABLET ORAL
Qty: 120 | Refills: 0 | Status: DISCONTINUED | COMMUNITY
Start: 2017-12-14 | End: 2020-02-07

## 2020-02-07 RX ORDER — PREGABALIN 25 MG/1
25 CAPSULE ORAL
Qty: 21 | Refills: 0 | Status: DISCONTINUED | COMMUNITY
Start: 2017-12-07 | End: 2020-02-07

## 2020-02-07 RX ORDER — CLINDAMYCIN HYDROCHLORIDE 300 MG/1
300 CAPSULE ORAL
Qty: 6 | Refills: 0 | Status: DISCONTINUED | COMMUNITY
Start: 2017-11-17 | End: 2020-02-07

## 2020-02-07 RX ORDER — NAPROXEN 500 MG/1
500 TABLET, DELAYED RELEASE ORAL
Qty: 60 | Refills: 1 | Status: DISCONTINUED | COMMUNITY
Start: 2018-07-30 | End: 2020-02-07

## 2020-02-07 RX ORDER — CELECOXIB 200 MG/1
200 CAPSULE ORAL
Qty: 2 | Refills: 0 | Status: DISCONTINUED | COMMUNITY
Start: 2018-03-14 | End: 2020-02-07

## 2020-02-07 RX ORDER — OXYCODONE 10 MG/1
10 TABLET ORAL
Qty: 180 | Refills: 0 | Status: DISCONTINUED | COMMUNITY
Start: 2017-11-22 | End: 2020-02-07

## 2020-02-07 RX ORDER — FENTANYL 25 UG/H
25 PATCH, EXTENDED RELEASE TRANSDERMAL
Qty: 10 | Refills: 0 | Status: DISCONTINUED | COMMUNITY
Start: 2017-07-08 | End: 2020-02-07

## 2020-02-07 RX ORDER — OXYCODONE 20 MG/1
20 TABLET ORAL
Qty: 30 | Refills: 0 | Status: DISCONTINUED | COMMUNITY
Start: 2018-02-23 | End: 2020-02-07

## 2020-02-07 RX ORDER — AZITHROMYCIN 250 MG/1
250 TABLET, FILM COATED ORAL
Qty: 6 | Refills: 0 | Status: DISCONTINUED | COMMUNITY
Start: 2020-01-29

## 2020-02-07 RX ORDER — CELECOXIB 200 MG/1
200 CAPSULE ORAL
Qty: 2 | Refills: 0 | Status: DISCONTINUED | COMMUNITY
Start: 2017-11-10 | End: 2020-02-07

## 2020-02-07 RX ORDER — OXYCODONE AND ACETAMINOPHEN 10; 325 MG/1; MG/1
10-325 TABLET ORAL
Qty: 50 | Refills: 0 | Status: DISCONTINUED | COMMUNITY
Start: 2017-12-07 | End: 2020-02-07

## 2020-02-07 RX ORDER — HYDROCODONE BITARTRATE AND ACETAMINOPHEN 10; 325 MG/1; MG/1
10-325 TABLET ORAL
Refills: 0 | Status: DISCONTINUED | COMMUNITY
End: 2020-02-07

## 2020-05-04 ENCOUNTER — APPOINTMENT (OUTPATIENT)
Dept: ORTHOPEDIC SURGERY | Facility: CLINIC | Age: 65
End: 2020-05-04

## 2020-10-15 NOTE — ASU PATIENT PROFILE, ADULT - NS PRO ABUSE SCREEN SUSPICION NEGLECT YN
Radiology Discharge Summary      Admit date: 10/15/2020 10:39 AM  Discharge date: October 15, 2020    Instructions Given to patient: YesVerbal    Diet: Regular    Activity:NO Restrictions    Medications on discharge (List): Refer to Discharge Medication List    Hospital Course: uneventul    Description of Condition on Discharge: Chief Complaint Resolved    Discharge Disposition: Home    Discharge Diagnosis: left hip pain         no

## 2021-02-02 ENCOUNTER — TRANSCRIPTION ENCOUNTER (OUTPATIENT)
Age: 66
End: 2021-02-02

## 2021-02-22 ENCOUNTER — APPOINTMENT (OUTPATIENT)
Dept: ORTHOPEDIC SURGERY | Facility: CLINIC | Age: 66
End: 2021-02-22
Payer: COMMERCIAL

## 2021-02-22 VITALS
WEIGHT: 202 LBS | HEART RATE: 69 BPM | SYSTOLIC BLOOD PRESSURE: 161 MMHG | HEIGHT: 71 IN | DIASTOLIC BLOOD PRESSURE: 85 MMHG | BODY MASS INDEX: 28.28 KG/M2

## 2021-02-22 PROCEDURE — 99072 ADDL SUPL MATRL&STAF TM PHE: CPT

## 2021-02-22 PROCEDURE — 99212 OFFICE O/P EST SF 10 MIN: CPT

## 2021-02-22 PROCEDURE — 73562 X-RAY EXAM OF KNEE 3: CPT | Mod: RT

## 2021-02-22 NOTE — ADDENDUM
[FreeTextEntry1] : This note was written by Harry Pyle on 02/22/2021 acting scribe for Dr. Feroz Davila M.D.\par \par I Dr. Feroz Davila have read and attest that all the information, medical decision making, and discharge instructions within are true and accurate.

## 2021-02-22 NOTE — DISCUSSION/SUMMARY
[de-identified] : Discussed at length the nature of the patient’s condition. Their bilateral TKR revisions are doing excellent and have reassured him that his implants are functioning well. Will have patient continue with his home exercises. Will recommend the patient wear a compressive knee sleeve to help relieve some mild muscular symptoms due to the nature of his work. Will also have the patient continue the Mobic as needed as well as the Prilosec that is being monitored by his PCP. They can continue activities as tolerated. Will have patient follow up with me here in the clinic in 1 year.

## 2021-02-22 NOTE — PHYSICAL EXAM
[de-identified] : Left Knee\par Inspection: no effusion\par Wounds: healed midline incision\par Alignment: normal.\par Palpation: no specific tenderness on palpation.\par ROM: Active (in degrees) 0-130 no instability and good strength \par Ligamentous laxity (neg): negative ant. drawer test, negative post. drawer test, stable to varus stress test, stable to valgus stress test,\par Patellofemoral Alignment Test: Q angle-, normal.\par Muscle Test: good quad strength.\par Leg examination: calf is soft and non-tender. \par \par Right Knee\par Inspection: no effusion\par Wounds: healed midline incision\par Alignment: normal.\par Palpation: no specific tenderness on palpation.\par ROM: Active (in degrees) 0-130 no instability and good strength \par Ligamentous laxity (neg): negative ant. drawer test, negative post. drawer test, stable to varus stress test, stable to valgus stress test,\par Patellofemoral Alignment Test: Q angle-, normal.\par Muscle Test: good quad strength.\par Leg examination: calf is soft and non-tender.  [de-identified] : Left knee xray, 3 views AP, Lateral, Merchant view taken at the office today demonstrates a revision total knee replacement in satisfactory position and alignment. No evidence of loosening. The patella sits in a central position. \par \par Right knee xray, 3 views AP, Lateral, Merchant view taken at the office today demonstrates a revision total knee replacement in satisfactory position and alignment. No evidence of loosening. The patella sits in a central position. \par

## 2021-02-22 NOTE — HISTORY OF PRESENT ILLNESS
[de-identified] : 64 y/o male presents for follow up evaluation s/p right revision TKR at 3 years and left revision TKR also at 3 years. The patient states that he occasionally gets muscular soreness after increase activity. But he takes Mobic regularly which he states helps with the pain. The patient's primary concern is whether or not the implant will come loose again. Otherwise the patient has no other concerns.

## 2022-01-13 NOTE — PROGRESS NOTE ADULT - PROBLEM SELECTOR PROBLEM 4
Patient called,  She wants to make a appointment to see either Dr. Gonzalez or nurse.  Patient said she has been urinating constantly for about 3 days.  Would like to see someone. Did offer patient an appoinment with Dr. Gonzalez in Feb. But she did not want to wait .   Please call patient back.    
Allergic rhinitis, mild
Allergic rhinitis, mild

## 2022-02-08 ENCOUNTER — RESULT REVIEW (OUTPATIENT)
Age: 67
End: 2022-02-08

## 2022-02-08 ENCOUNTER — APPOINTMENT (OUTPATIENT)
Dept: ORTHOPEDIC SURGERY | Facility: CLINIC | Age: 67
End: 2022-02-08
Payer: COMMERCIAL

## 2022-02-08 ENCOUNTER — OUTPATIENT (OUTPATIENT)
Dept: OUTPATIENT SERVICES | Facility: HOSPITAL | Age: 67
LOS: 1 days | End: 2022-02-08
Payer: COMMERCIAL

## 2022-02-08 VITALS — HEIGHT: 72 IN | BODY MASS INDEX: 27.77 KG/M2 | WEIGHT: 205 LBS

## 2022-02-08 DIAGNOSIS — Z96.651 PRESENCE OF RIGHT ARTIFICIAL KNEE JOINT: Chronic | ICD-10-CM

## 2022-02-08 DIAGNOSIS — Z98.890 OTHER SPECIFIED POSTPROCEDURAL STATES: Chronic | ICD-10-CM

## 2022-02-08 DIAGNOSIS — Z96.652 PRESENCE OF LEFT ARTIFICIAL KNEE JOINT: Chronic | ICD-10-CM

## 2022-02-08 PROCEDURE — 73030 X-RAY EXAM OF SHOULDER: CPT

## 2022-02-08 PROCEDURE — 73030 X-RAY EXAM OF SHOULDER: CPT | Mod: 26,RT

## 2022-02-08 PROCEDURE — 99213 OFFICE O/P EST LOW 20 MIN: CPT

## 2022-02-08 RX ORDER — OMEPRAZOLE 10 MG/1
10 CAPSULE, DELAYED RELEASE ORAL
Qty: 30 | Refills: 0 | Status: DISCONTINUED | COMMUNITY
Start: 2020-01-29 | End: 2022-02-08

## 2022-02-08 RX ORDER — ALBUTEROL SULFATE 90 UG/1
108 (90 BASE) INHALANT RESPIRATORY (INHALATION)
Qty: 8 | Refills: 0 | Status: DISCONTINUED | COMMUNITY
Start: 2020-01-29 | End: 2022-02-08

## 2022-02-08 RX ORDER — AMLODIPINE BESYLATE 5 MG/1
5 TABLET ORAL
Refills: 0 | Status: ACTIVE | COMMUNITY

## 2022-03-04 ENCOUNTER — APPOINTMENT (OUTPATIENT)
Dept: CT IMAGING | Facility: CLINIC | Age: 67
End: 2022-03-04
Payer: COMMERCIAL

## 2022-03-04 ENCOUNTER — OUTPATIENT (OUTPATIENT)
Dept: OUTPATIENT SERVICES | Facility: HOSPITAL | Age: 67
LOS: 1 days | End: 2022-03-04

## 2022-03-04 ENCOUNTER — RESULT REVIEW (OUTPATIENT)
Age: 67
End: 2022-03-04

## 2022-03-04 DIAGNOSIS — Z98.890 OTHER SPECIFIED POSTPROCEDURAL STATES: Chronic | ICD-10-CM

## 2022-03-04 DIAGNOSIS — Z96.651 PRESENCE OF RIGHT ARTIFICIAL KNEE JOINT: Chronic | ICD-10-CM

## 2022-03-04 DIAGNOSIS — Z96.652 PRESENCE OF LEFT ARTIFICIAL KNEE JOINT: Chronic | ICD-10-CM

## 2022-03-04 PROCEDURE — 73200 CT UPPER EXTREMITY W/O DYE: CPT | Mod: 26,RT

## 2022-03-25 VITALS
HEART RATE: 82 BPM | OXYGEN SATURATION: 98 % | DIASTOLIC BLOOD PRESSURE: 82 MMHG | SYSTOLIC BLOOD PRESSURE: 148 MMHG | RESPIRATION RATE: 16 BRPM | TEMPERATURE: 97 F | HEIGHT: 71 IN | WEIGHT: 210.76 LBS

## 2022-03-25 RX ORDER — CHLORHEXIDINE GLUCONATE 213 G/1000ML
1 SOLUTION TOPICAL EVERY 12 HOURS
Refills: 0 | Status: DISCONTINUED | OUTPATIENT
Start: 2022-03-28 | End: 2022-03-28

## 2022-03-25 RX ORDER — INFLUENZA VIRUS VACCINE 15; 15; 15; 15 UG/.5ML; UG/.5ML; UG/.5ML; UG/.5ML
0.7 SUSPENSION INTRAMUSCULAR ONCE
Refills: 0 | Status: DISCONTINUED | OUTPATIENT
Start: 2022-03-28 | End: 2022-03-28

## 2022-03-25 RX ORDER — LOSARTAN POTASSIUM 100 MG/1
1 TABLET, FILM COATED ORAL
Qty: 0 | Refills: 0 | DISCHARGE

## 2022-03-25 RX ORDER — ESCITALOPRAM OXALATE 10 MG/1
1 TABLET, FILM COATED ORAL
Qty: 0 | Refills: 0 | DISCHARGE

## 2022-03-25 NOTE — H&P ADULT - NSICDXFAMILYHX_GEN_ALL_CORE_FT
FAMILY HISTORY:  Father  Still living? No  Family history of coronary artery disease in father, Age at diagnosis: Age Unknown  Family history of diabetes mellitus in father, Age at diagnosis: Age Unknown  Family history of hypertension in father, Age at diagnosis: Age Unknown

## 2022-03-25 NOTE — PATIENT PROFILE ADULT - FALL HARM RISK - UNIVERSAL INTERVENTIONS
Bed in lowest position, wheels locked, appropriate side rails in place/Call bell, personal items and telephone in reach/Instruct patient to call for assistance before getting out of bed or chair/Non-slip footwear when patient is out of bed/[place] to call system/Physically safe environment - no spills, clutter or unnecessary equipment/Purposeful Proactive Rounding/Room/bathroom lighting operational, light cord in reach

## 2022-03-25 NOTE — H&P ADULT - NSHPPHYSICALEXAM_GEN_ALL_CORE
MSK:  decreased ROM right shoulder 2/2 pain  Remainder of physical exam as per medical clearance note MSK:  decreased ROM right shoulder 2/2 pain  Sensation intact to bilateral UE distally. Motor Strength 5/5 to /interossei/triceps/biceps/deltoid bilaterally. AIN/PIN intact.   Radial pulses palpable bilaterally   Remainder of physical exam as per medical clearance note

## 2022-03-25 NOTE — PRE-OP CHECKLIST - AS BP NONINV METHOD
Relevant Problems   ANESTHESIA   (+) MICHAEL (obstructive sleep apnea)      CARDIAC   (+) Paroxysmal atrial fibrillation (HCC)       Physical Exam    Airway   Mallampati: II  TM distance: >3 FB  Neck ROM: full       Cardiovascular - normal exam  Rhythm: regular  Rate: normal  (-) murmur     Dental - normal exam             Pulmonary - normal exam  Breath sounds clear to auscultation     Abdominal    Neurological - normal exam                 Anesthesia Plan    ASA 2       Plan - general       Airway plan will be LMA        Induction: intravenous    Postoperative Plan: Postoperative administration of opioids is intended.    Pertinent diagnostic labs and testing reviewed    Informed Consent:    Anesthetic plan and risks discussed with patient.    Use of blood products discussed with: patient whom consented to blood products.          electronic

## 2022-03-25 NOTE — H&P ADULT - NSICDXPASTSURGICALHX_GEN_ALL_CORE_FT
PAST SURGICAL HISTORY:  H/O total knee replacement, left 2010    H/O total knee replacement, right 2009 revision x2    History of surgery left ankle TIB/FIB repair secondary to combound FX.    History of surgery right knee total knee replacement revision (2015)    History of surgery left knee revision of total knee replacement (2013)

## 2022-03-25 NOTE — H&P ADULT - NSHPLABSRESULTS_GEN_ALL_CORE
preop cbc/cmp - within normal limits, reviewed by medical clearance  Cr 1.0 preop  ekg within normal limits, reviewed by medical clearance   covid pcr: preop cbc/cmp - within normal limits, reviewed by medical clearance  Cr 1.0 preop  ekg within normal limits, reviewed by medical clearance   covid pcr: negative 3/25/22

## 2022-03-25 NOTE — H&P ADULT - HISTORY OF PRESENT ILLNESS
66M with right shoulder pain x   Presents for elective right TSR  66M with right shoulder pain x chronic. Pt denies acute/preceding trauma/injury. Pt states his shoulder pain is localized with intermittent radiation to his right elbow. He denies numbness/tingling/weakness of his right upper extremity. He takes meloxicam and oxycodone as needed for pain control. Denies DVT hx; denies CP, SOB, N/V, tactile fevers, calf pain.    Presents for elective right TSR

## 2022-03-27 ENCOUNTER — TRANSCRIPTION ENCOUNTER (OUTPATIENT)
Age: 67
End: 2022-03-27

## 2022-03-28 ENCOUNTER — TRANSCRIPTION ENCOUNTER (OUTPATIENT)
Age: 67
End: 2022-03-28

## 2022-03-28 ENCOUNTER — OUTPATIENT (OUTPATIENT)
Dept: INPATIENT UNIT | Facility: HOSPITAL | Age: 67
LOS: 1 days | Discharge: HOME | End: 2022-03-28
Payer: COMMERCIAL

## 2022-03-28 ENCOUNTER — APPOINTMENT (OUTPATIENT)
Dept: ORTHOPEDIC SURGERY | Facility: HOSPITAL | Age: 67
End: 2022-03-28

## 2022-03-28 VITALS
DIASTOLIC BLOOD PRESSURE: 86 MMHG | SYSTOLIC BLOOD PRESSURE: 124 MMHG | OXYGEN SATURATION: 97 % | RESPIRATION RATE: 19 BRPM | HEART RATE: 84 BPM

## 2022-03-28 DIAGNOSIS — Z98.890 OTHER SPECIFIED POSTPROCEDURAL STATES: Chronic | ICD-10-CM

## 2022-03-28 DIAGNOSIS — Z96.652 PRESENCE OF LEFT ARTIFICIAL KNEE JOINT: Chronic | ICD-10-CM

## 2022-03-28 DIAGNOSIS — F41.9 ANXIETY DISORDER, UNSPECIFIED: ICD-10-CM

## 2022-03-28 DIAGNOSIS — M19.011 PRIMARY OSTEOARTHRITIS, RIGHT SHOULDER: ICD-10-CM

## 2022-03-28 DIAGNOSIS — Z96.651 PRESENCE OF RIGHT ARTIFICIAL KNEE JOINT: Chronic | ICD-10-CM

## 2022-03-28 PROCEDURE — C1713: CPT

## 2022-03-28 PROCEDURE — 86900 BLOOD TYPING SEROLOGIC ABO: CPT

## 2022-03-28 PROCEDURE — C1776: CPT

## 2022-03-28 PROCEDURE — 23472 RECONSTRUCT SHOULDER JOINT: CPT | Mod: RT

## 2022-03-28 PROCEDURE — 86901 BLOOD TYPING SEROLOGIC RH(D): CPT

## 2022-03-28 PROCEDURE — 76000 FLUOROSCOPY <1 HR PHYS/QHP: CPT

## 2022-03-28 PROCEDURE — 86850 RBC ANTIBODY SCREEN: CPT

## 2022-03-28 DEVICE — TRAY HUM REV ADPTER PLUS 0MM: Type: IMPLANTABLE DEVICE | Status: FUNCTIONAL

## 2022-03-28 DEVICE — SCREW LOKG COMP REV 4.5X30MM: Type: IMPLANTABLE DEVICE | Status: FUNCTIONAL

## 2022-03-28 DEVICE — KIT SCREW TORQUE DEF: Type: IMPLANTABLE DEVICE | Status: FUNCTIONAL

## 2022-03-28 DEVICE — IMP EQUINOXE REV GLENOSPHERE SM 36MM: Type: IMPLANTABLE DEVICE | Status: FUNCTIONAL

## 2022-03-28 DEVICE — SCREW LOKG REV EQ: Type: IMPLANTABLE DEVICE | Status: FUNCTIONAL

## 2022-03-28 DEVICE — KWIRE GLENOID 2MM: Type: IMPLANTABLE DEVICE | Status: FUNCTIONAL

## 2022-03-28 DEVICE — PLATE GLENOID EQUINOXE REV AUG SM POSTERIOR RT: Type: IMPLANTABLE DEVICE | Status: FUNCTIONAL

## 2022-03-28 DEVICE — IMPLANTABLE DEVICE: Type: IMPLANTABLE DEVICE | Status: FUNCTIONAL

## 2022-03-28 DEVICE — LINER HUM REV 36MM PLUS 0: Type: IMPLANTABLE DEVICE | Status: FUNCTIONAL

## 2022-03-28 DEVICE — SCREW LOKG COMP REV 4.5X18MM: Type: IMPLANTABLE DEVICE | Status: FUNCTIONAL

## 2022-03-28 RX ORDER — ACETAMINOPHEN 500 MG
1000 TABLET ORAL ONCE
Refills: 0 | Status: COMPLETED | OUTPATIENT
Start: 2022-03-28 | End: 2022-03-28

## 2022-03-28 RX ORDER — CEFAZOLIN SODIUM 1 G
1000 VIAL (EA) INJECTION EVERY 8 HOURS
Refills: 0 | Status: COMPLETED | OUTPATIENT
Start: 2022-03-28 | End: 2022-03-28

## 2022-03-28 RX ORDER — MUPIROCIN 20 MG/G
1 OINTMENT TOPICAL
Refills: 0 | Status: DISCONTINUED | OUTPATIENT
Start: 2022-03-28 | End: 2022-03-28

## 2022-03-28 RX ORDER — HYDROMORPHONE HYDROCHLORIDE 2 MG/ML
0.5 INJECTION INTRAMUSCULAR; INTRAVENOUS; SUBCUTANEOUS
Refills: 0 | Status: DISCONTINUED | OUTPATIENT
Start: 2022-03-28 | End: 2022-03-28

## 2022-03-28 RX ORDER — CELECOXIB 200 MG/1
200 CAPSULE ORAL ONCE
Refills: 0 | Status: COMPLETED | OUTPATIENT
Start: 2022-03-28 | End: 2022-03-28

## 2022-03-28 RX ORDER — BUPIVACAINE 13.3 MG/ML
20 INJECTION, SUSPENSION, LIPOSOMAL INFILTRATION ONCE
Refills: 0 | Status: DISCONTINUED | OUTPATIENT
Start: 2022-03-28 | End: 2022-03-28

## 2022-03-28 RX ORDER — OXYCODONE HYDROCHLORIDE 5 MG/1
1 TABLET ORAL
Qty: 0 | Refills: 0 | DISCHARGE

## 2022-03-28 RX ADMIN — CELECOXIB 200 MILLIGRAM(S): 200 CAPSULE ORAL at 17:30

## 2022-03-28 RX ADMIN — Medication 400 MILLIGRAM(S): at 17:20

## 2022-03-28 RX ADMIN — Medication 1000 MILLIGRAM(S): at 17:58

## 2022-03-28 RX ADMIN — CELECOXIB 200 MILLIGRAM(S): 200 CAPSULE ORAL at 17:58

## 2022-03-28 RX ADMIN — CELECOXIB 200 MILLIGRAM(S): 200 CAPSULE ORAL at 10:30

## 2022-03-28 RX ADMIN — Medication 100 MILLIGRAM(S): at 17:30

## 2022-03-28 RX ADMIN — CHLORHEXIDINE GLUCONATE 1 APPLICATION(S): 213 SOLUTION TOPICAL at 10:26

## 2022-03-28 RX ADMIN — Medication 1000 MILLIGRAM(S): at 10:30

## 2022-03-28 NOTE — PACU DISCHARGE NOTE - COMMENTS
patient hemodynamically stable. Patient verbalized tingling/numbness to right arm due to block. Patient able to  my fingers. Cap refill less than 3 seconds. + radial pulse to RUE. OOB to bathroom and pass TOV. Tolerating PO intake. Moving all extremities. Cleared by anesthesiologist to go home. GEORGES Pelletier reviewed D/C instructions and verbalized good understanding. IV removed. Patient escorted to front lobby where his wife awaits to take him home.

## 2022-03-28 NOTE — ASU DISCHARGE PLAN (ADULT/PEDIATRIC) - CARE PROVIDER_API CALL
Angelo Kim)  Orthopaedic Surgery  [address], [address] [place] [address]  Phone: [phone]  Fax: [phone]  Follow Up Time:

## 2022-03-28 NOTE — ASU DISCHARGE PLAN (ADULT/PEDIATRIC) - NS MD DC FALL RISK RISK
For information on Fall & Injury Prevention, visit: [email or web address] OR  [email or web address] OR  https://www.cdc.gov/steadi/patient.html

## 2022-03-30 PROBLEM — I10 ESSENTIAL (PRIMARY) HYPERTENSION: Chronic | Status: ACTIVE | Noted: 2022-03-25

## 2022-03-31 DIAGNOSIS — M19.011 PRIMARY OSTEOARTHRITIS, RIGHT SHOULDER: ICD-10-CM

## 2022-04-06 NOTE — PATIENT PROFILE ADULT. - BILL OF RIGHTS/ADMISSION INFORMATION PROVIDED TO:
Patient Clindamycin Pregnancy And Lactation Text: This medication can be used in pregnancy if certain situations. Clindamycin is also present in breast milk.

## 2022-04-12 ENCOUNTER — RESULT REVIEW (OUTPATIENT)
Age: 67
End: 2022-04-12

## 2022-04-12 ENCOUNTER — APPOINTMENT (OUTPATIENT)
Dept: ORTHOPEDIC SURGERY | Facility: CLINIC | Age: 67
End: 2022-04-12
Payer: COMMERCIAL

## 2022-04-12 ENCOUNTER — OUTPATIENT (OUTPATIENT)
Dept: OUTPATIENT SERVICES | Facility: HOSPITAL | Age: 67
LOS: 1 days | End: 2022-04-12
Payer: COMMERCIAL

## 2022-04-12 VITALS — BODY MASS INDEX: 27.77 KG/M2 | HEIGHT: 72 IN | WEIGHT: 205 LBS

## 2022-04-12 DIAGNOSIS — Z96.652 PRESENCE OF LEFT ARTIFICIAL KNEE JOINT: Chronic | ICD-10-CM

## 2022-04-12 DIAGNOSIS — Z98.890 OTHER SPECIFIED POSTPROCEDURAL STATES: Chronic | ICD-10-CM

## 2022-04-12 DIAGNOSIS — Z96.651 PRESENCE OF RIGHT ARTIFICIAL KNEE JOINT: Chronic | ICD-10-CM

## 2022-04-12 PROCEDURE — 99024 POSTOP FOLLOW-UP VISIT: CPT

## 2022-04-12 PROCEDURE — 73030 X-RAY EXAM OF SHOULDER: CPT | Mod: 26,RT

## 2022-04-12 PROCEDURE — 73030 X-RAY EXAM OF SHOULDER: CPT

## 2022-04-12 RX ORDER — OXYCODONE 10 MG/1
10 TABLET ORAL
Refills: 0 | Status: DISCONTINUED | COMMUNITY
End: 2022-04-12

## 2022-04-12 RX ORDER — OXYCODONE 10 MG/1
10 TABLET ORAL
Qty: 30 | Refills: 0 | Status: DISCONTINUED | COMMUNITY
Start: 2022-03-23 | End: 2022-04-12

## 2022-05-10 ENCOUNTER — APPOINTMENT (OUTPATIENT)
Dept: ORTHOPEDIC SURGERY | Facility: CLINIC | Age: 67
End: 2022-05-10
Payer: COMMERCIAL

## 2022-05-10 VITALS — HEIGHT: 72 IN | WEIGHT: 209 LBS | BODY MASS INDEX: 28.31 KG/M2

## 2022-05-10 PROCEDURE — 99024 POSTOP FOLLOW-UP VISIT: CPT

## 2022-05-10 RX ORDER — MELOXICAM 15 MG/1
15 TABLET ORAL DAILY
Qty: 30 | Refills: 2 | Status: DISCONTINUED | COMMUNITY
Start: 2019-05-06 | End: 2022-05-10

## 2022-05-10 RX ORDER — CELECOXIB 200 MG/1
200 CAPSULE ORAL TWICE DAILY
Qty: 30 | Refills: 0 | Status: DISCONTINUED | COMMUNITY
Start: 2022-03-23 | End: 2022-05-10

## 2022-07-12 ENCOUNTER — APPOINTMENT (OUTPATIENT)
Dept: ORTHOPEDIC SURGERY | Facility: CLINIC | Age: 67
End: 2022-07-12

## 2022-07-12 VITALS — BODY MASS INDEX: 29.66 KG/M2 | HEIGHT: 72 IN | WEIGHT: 219 LBS

## 2022-07-12 PROCEDURE — 99213 OFFICE O/P EST LOW 20 MIN: CPT

## 2022-07-12 NOTE — DISCUSSION/SUMMARY
[de-identified] : Doing very well 3 months following right reverse total shoulder arthroplasty.  I reminded him that lack of internal rotation is the most common problem with reverse total shoulder arthroplasty and is a function of the design of the prosthesis.  He will improve motion for up to 9 months following surgery and I reviewed the exercises to improve internal rotation.\par \par I also advised him that he may progress at increased weight lifting as comfort permits and that the only limitation in exercise is that the exercises should not cause pain and he should increase the intensity of exercise at a pace that does not hurt.\par \par I will reevaluate and repeat radiographs of the  right shoulder in 3 months.  Today his clinical right shoulder [employer] score is 95 on a scale of 100 indicating excellent shoulder function and a marked improvement over his preoperative score of 43.

## 2022-07-12 NOTE — HISTORY OF PRESENT ILLNESS
[de-identified] : Armando returns today 3 months following reverse right total shoulder arthroplasty.  He has resumed exercises in the gym and has no pain with activities of daily living.  His principal concern today is lack of internal rotation and inability to talk assured into his trousers.

## 2022-07-12 NOTE — CONSULT LETTER
[Dear  ___] : Dear  [unfilled], [FreeTextEntry1] : Today I had the pleasure of evaluating your very nice patient NEVIN SOLO  who requested that I share my findings with you. I very much appreciate the referral. \par \par Please review my office note below and, needless to say, please call or email me with any questions or concerns.\par \par I appreciate the opportunity to participate in his care.\par \par Sincerely,\par \par Angelo Kim MD\par Director, Orthopaedic Surgery\par and Orthopaedic [employer] Initiatives \par [facility]\par Office: [phone]\par Cell: [phone]\par Email: [email or web address]\par Website: [email or web address] \par \par \par \par \par

## 2022-07-12 NOTE — PHYSICAL EXAM
[de-identified] : The right shoulder has 160 degrees active elevation, 60 degrees external rotation, and internal rotation to the fifth lumbar vertebra.  He can fully range the arm without pain and has good strength of external rotation.

## 2022-07-25 ENCOUNTER — APPOINTMENT (OUTPATIENT)
Dept: ORTHOPEDIC SURGERY | Facility: CLINIC | Age: 67
End: 2022-07-25

## 2022-07-25 VITALS — WEIGHT: 218 LBS | HEIGHT: 72 IN | BODY MASS INDEX: 29.53 KG/M2

## 2022-07-25 PROCEDURE — 73562 X-RAY EXAM OF KNEE 3: CPT | Mod: LT

## 2022-07-25 PROCEDURE — 99213 OFFICE O/P EST LOW 20 MIN: CPT

## 2022-07-25 NOTE — HISTORY OF PRESENT ILLNESS
[de-identified] : 4.5 years s/p right revision, 4 years s/p left revision\par He states the riht is doing well., The left he has noticed some pain for about 2 months, which is worsening\par Weakness swelling and difficulty descending stairs. he also states about a month ago he had birning medial aspect, which then turned into tenderness that has since resolved. He takes Moic as well as oxy 10 as needed presc by pm. Pt states he had a reverse right shoulder replacement in march of 2022, which he has been ging to pt for. He states pt for the knees in th past has helped significantly, and he would like to return.\par

## 2022-07-25 NOTE — PHYSICAL EXAM
[de-identified] : Left Knee\par Inspection: no effusion\par Wounds: healed midline incision\par Alignment: normal.\par Palpation: no specific tenderness on palpation.\par ROM: Active (in degrees) 0-130, AP translation 5-10mm\par Ligamentous laxity (neg): negative ant. drawer test, negative post. drawer test, stable to varus stress test, stable to valgus stress test,\par Patellofemoral Alignment Test: Q angle-, normal.\par Muscle Test: good quad strength.\par Leg examination: calf is soft and non-tender. \par \par Right Knee\par Inspection: no effusion\par Wounds: healed midline incision\par Alignment: normal.\par Palpation: no specific tenderness on palpation.\par ROM: Active (in degrees) 0-130, AP translation <10mm\par Ligamentous laxity (neg): negative ant. drawer test, negative post. drawer test, stable to varus stress test, stable to valgus stress test,\par Patellofemoral Alignment Test: Q angle-, normal.\par Muscle Test: good quad strength.\par Leg examination: calf is soft and non-tender.  [de-identified] : Left knee AP, lateral, merchant view demonstrates a revision total knee replacement in satisfactory position and alignment. No evidence of loosening. The patella sits in a central position. \par \par Right knee AP, lateral, merchant view demonstrates a revision total knee replacement in satisfactory position and alignment. No evidence of loosening. The patella sits in a central position.

## 2022-07-25 NOTE — PATIENT PROFILE ADULT - AGENT'S NAME
1400- arrived to 240 alert and oriented, ambulatory, NP Saeed Anders aware of arrival to floor at this time    1530- IVF and toradol adminsitered    1815- rounding on patinet no needs at this time Gwendolyn Sanchez

## 2022-07-25 NOTE — DISCUSSION/SUMMARY
[de-identified] : Patient presents s/p bilateral revision TKR, overall doing well. I believe his left knee symptoms are muscular in nature. I suggested a course of physical therapy. I reviewed prior xrays as well as xrays taken today in detail with the patient.  He will follow up in 1 year for routine checkup.

## 2022-10-27 ENCOUNTER — APPOINTMENT (OUTPATIENT)
Dept: ORTHOPEDIC SURGERY | Facility: CLINIC | Age: 67
End: 2022-10-27

## 2022-10-27 PROCEDURE — 99212 OFFICE O/P EST SF 10 MIN: CPT

## 2022-10-31 NOTE — BRIEF OPERATIVE NOTE - ELECTIVE PROCEDURE
[de-identified] : Initial visit.\par Her chief complaint today is left ear pressure for a few days.
Yes

## 2023-01-10 NOTE — H&P ADULT - NSHPPOAPULMEMBOLUS_GEN_A_CORE
Order for DME signed and routed to PCP for co-sign. After signing,please route back to the nurses so order can be sent with supporting documentation to [facility]. Thank you   no

## 2023-05-12 ENCOUNTER — APPOINTMENT (OUTPATIENT)
Dept: ORTHOPEDIC SURGERY | Facility: CLINIC | Age: 68
End: 2023-05-12
Payer: COMMERCIAL

## 2023-05-12 VITALS — HEIGHT: 72 IN | WEIGHT: 218 LBS | BODY MASS INDEX: 29.53 KG/M2

## 2023-05-12 DIAGNOSIS — M19.011 PRIMARY OSTEOARTHRITIS, RIGHT SHOULDER: ICD-10-CM

## 2023-05-12 PROCEDURE — 73030 X-RAY EXAM OF SHOULDER: CPT | Mod: RT

## 2023-05-12 PROCEDURE — 99212 OFFICE O/P EST SF 10 MIN: CPT

## 2023-06-23 NOTE — CONSULT NOTE ADULT - PROBLEM SELECTOR RECOMMENDATION 4
-- DO NOT REPLY / DO NOT REPLY ALL --  -- Message is from Engagement Center Operations (ECO) --    Referral Request  Name of Specialist: unknown  Provider's specialty: Urology    Medical condition for referral:  bladder    Is this a NEW request?: yes      Referral ordered by: Dr Marlin lopez      Insurance type: [facility]      Payor:  [employer] [facility] MEDIC[employer] / Plan:  BE AET PRIME HMO / Product Type: [employer] MEDICARE ADVANTAGE      Preferred Delivery Method   Fax - number to send to: [phone]    Caller Information       Type Contact Phone/Fax    06/23/2023 10:58 AM CDT Phone (Incoming) ilana (Provider) [phone]     [facility]     [phone]          Alternative phone number: none    Can a detailed message be left? Yes    Please give this turnaround time to the caller:   \"This message will be sent to [state Provider's full name]. The clinical team will return your call as soon as they review your message. Typically, it takes 3 business days to process referral requests.\"   symptomatic rx

## 2023-06-30 ENCOUNTER — APPOINTMENT (OUTPATIENT)
Dept: ORTHOPEDIC SURGERY | Facility: CLINIC | Age: 68
End: 2023-06-30
Payer: COMMERCIAL

## 2023-06-30 VITALS — WEIGHT: 224 LBS | HEIGHT: 72 IN | BODY MASS INDEX: 30.34 KG/M2

## 2023-06-30 DIAGNOSIS — Z96.653 PRESENCE OF ARTIFICIAL KNEE JOINT, BILATERAL: ICD-10-CM

## 2023-06-30 PROCEDURE — 73562 X-RAY EXAM OF KNEE 3: CPT | Mod: RT

## 2023-06-30 PROCEDURE — 99213 OFFICE O/P EST LOW 20 MIN: CPT

## 2023-06-30 NOTE — PHYSICAL EXAM
[de-identified] : General appearance: well nourished and hydrated, pleasant, alert and oriented x 3, cooperative.\par HEENT: Normocephalic, EOM intact, Nasal septum midline, Oral cavity clear, External auditory canal clear.\par Cardiovascular: no apparent abnormalities, no lower leg edema, no varicosities, pedal pulses are palpable.\par Lymphatics Lymph nodes: none palpated, Lymphedema: not present.\par Neurologic: sensation is normal, no muscle weakness in upper or lower extremities, patella tendon reflexes intact .\par Dermatologic no apparent skin lesions, moist, warm, no rash.\par Spine:cervical spine appears normal and moves freely, thoracic spine appears normal and moves freely, lumbosacral spine appears normal and moves freely.\par Gait: nonantalgic.\par \par Right Knee\par Inspection: no effusion or erythema. ecchymosis and burising over medial aspect\par Wounds: healed midline incision \par Alignment: normal.\par Palpation: tenderness over contusion on palpation.\par ROM: Active (in degrees): 0-130\par Ligamentous laxity (neg): all ligaments appear stable, negative ant. drawer test, negative post. drawer test, stable to varus stress test, stable to valgus stress test.\par Patellofemoral Alignment Test: Q angle-, normal.\par Muscle Test: good quad strength.\par Leg examination: calf is soft and non-tender.\par \par Left Knee\par Inspection: no effusion or erythema.\par Wounds: healed midline incision \par Alignment: normal.\par Palpation: no specific tenderness on palpation.\par ROM: Active (in degrees): 0-130\par Ligamentous laxity (neg): all ligaments appear stable, negative ant. drawer test, negative post. drawer test, stable to varus stress test, stable to valgus stress test,\par Patellofemoral Alignment Test: Q angle-, normal.\par Muscle Test: good quad strength.\par Leg examination: calf is soft and non-tender. [de-identified] : Left knee AP, lateral, merchant view demonstrates a revision total knee replacement in satisfactory position and alignment. No evidence of loosening. The patella sits in a central position. \par \par Right knee AP, lateral, merchant view demonstrates a revision total knee replacement in satisfactory position and alignment. No evidence of loosening. The patella sits in a central position.

## 2023-06-30 NOTE — DISCUSSION/SUMMARY
[de-identified] : Patient is s/p Bilateral revision TKR at [facility]. There are no interval changes in the radiographs compared to last years films, which I reviewed with them,  I have reassured them that their implants are functioning well. He may have developed a tendonitis in the right knee over the pes, but he does not have instability. I also explained that he has significant bone loss and it is augmented. This may be activity related with his increased walking due at work. The left revision TKR is doing well.\par \par He is encouraged to wear a hinge knee brace, which was provided to him. I also suggested Diclofenac 75 BID. He is currently going to pain management.\par \par I will see them back in 1 year,  sooner if they have any acute symptoms.

## 2023-06-30 NOTE — HISTORY OF PRESENT ILLNESS
[de-identified] : NEVIN SOLO 68 year old male presents for follow-up evaluation s/p RIGHT revision TKR done in December 2017 and a LEFT revision TKR done in April 2018. He is doing well but has noticed that for the last 3 months he has some more pain than usual. He was gardening last week and while pulling some weeds he tumbled onto his back. He did not fall on his knee but did have some bruising on the medial compartment of his right knee. He is taking Meloxicam daily which use to help his knee pain but is no longer. He was taking Oxycodone 10 mg once a day but has started taking it twice a day due to his right knee pain. He exercises on his own daily. Of note, he is recovering from a right shoulder replacement done in March 2023.

## 2023-06-30 NOTE — ADDENDUM
[FreeTextEntry1] : This note was written by RONNIE RODRIGUEZ on 06/30/2023 acting as scribe for Dr. Feroz Davila M.D.\par \par I, Dr. Feroz Davila, have read and attest that all the information, medical decision making and discharge instructions within are true and accurate. \par \par This note was written by Dwayne Myrick on 06/30/2023 acting as scribe for Dr. Feroz Davila M.D.\par \par I, Dr. Feroz Davila, have read and attest that all the information, medical decision making and discharge instructions within are true and accurate.

## 2023-07-17 ENCOUNTER — TRANSCRIPTION ENCOUNTER (OUTPATIENT)
Age: 68
End: 2023-07-17

## 2023-07-25 NOTE — H&P ADULT - PROBLEM SELECTOR PLAN 2
Specialty Pharmacy - Initial Clinical Assessment    Specialty Medication Orders Linked to Encounter      Flowsheet Row Most Recent Value   Medication #1 onabotulinumtoxina (BOTOX) 100 unit SolR (Order#[id number], Rx#[id number])          Patient Diagnosis   G51.39 - Hemifacial spasm, unspecified laterality    Sincere Garcia is a 38 y.o. male, who is followed by the specialty pharmacy service for management and education.    Recent Encounters       Date Type Provider Description    06/26/2023 Specialty Pharmacy Mildred Lima, [place] Initial Clinical Assessment    02/27/2023 Specialty Pharmacy Connor Giron, [place] Referral Authorization            Current Outpatient Medications   Medication Sig    losartan-hydrochlorothiazide 100-25 mg (HYZAAR) 100-25 mg per tablet Take 1 tablet by mouth once daily.    trihexyphenidyL (ARTANE) 2 MG tablet Take 0.5 tablets by mouth 2 (two) times a day.    onabotulinumtoxina (BOTOX) 100 unit SolR inject 100 units intramuscularly to interfacial muscle every 3 months   Last reviewed on 7/25/2023  9:47 AM by Mildred Lima, [place]    Review of patient's allergies indicates:   Allergen Reactions    Ace inhibitors    Last reviewed on  7/25/2023 9:47 AM by Mildred Lima          Assessment Questions - Documented Responses      Flowsheet Row Most Recent Value   Assessment    Medication Reconciliation completed for patient Yes   During the past 4 weeks, has patient missed any activities due to condition or medication? No   During the past 4 weeks, did patient have any of the following urgent care visits? None   Goals of Therapy Status Achieving   Status of the patients ability to self-administer: Caregiver to administer   All education points have been covered with patient? No, patient declined- printed education provided   Welcome packet contents reviewed and discussed with patient? No   Assesment completed? Yes   Plan Therapy continued   Do you need to open a clinical intervention  (i-vent)? No   Do you want to schedule first shipment? Yes   Medication #1 Assessment Info    Patient status Existing medication, New to [facility]   Is this medication appropriate for the patient? Yes   Is this medication effective? Yes          Refill Questions - Documented Responses      Flowsheet Row Most Recent Value   Patient Availability and HIPAA Verification    Does patient want to proceed with activity? Yes   HIPAA/medical authority confirmed? Yes   Relationship to patient of person spoken to? Self   Refill Screening Questions    When does the patient need to receive the medication? 08/01/23   Refill Delivery Questions    How will the patient receive the medication?    When does the patient need to receive the medication? 08/01/23   Shipping Address Prescription   Address in [facility] confirmed and updated if neccessary? Yes   Expected Copay ($) 0   Is the patient able to afford the medication copay? Yes   Payment Method zero copay   Days supply of Refill 84   Supplies needed? No supplies needed   Refill activity completed? Yes   Refill activity plan Refill scheduled   Shipment/Pickup Date: 07/28/23            Objective    He has no past medical history on file.    Tried/failed medications: Botox, Artane    BP Readings from Last 4 Encounters:   No data found for BP     Ht Readings from Last 4 Encounters:   No data found for Ht     Wt Readings from Last 4 Encounters:   No data found for Wt       The goals of prescribed drug therapy management include:  Supporting patient to meet the prescriber's medical treatment objectives  Improving or maintaining quality of life  Maintaining optimal therapy adherence  Minimizing and managing side effects      Goals of Therapy Status: Achieving    Assessment/Plan  Patient plans to continue therapy without changes      Indication, dosage, appropriateness, effectiveness, safety and convenience of his specialty medication(s) were reviewed today.     Patient Education    Pharmacist offer to  patient was declined. Printed educational materials will be provided with medication.     Patient has been on therapy for years, new to [facility].     Tasks added this encounter   No tasks added.   Tasks due within next 3 months   7/24/2023 - Initial Clinical Assessment/Patient Education (1 Time Occurence)     Mildred Lima, PharmD  [facility]  [address]  [facility] [address]  Phone: [phone]  Fax: [phone]   continue home meds

## 2023-09-08 ENCOUNTER — TRANSCRIPTION ENCOUNTER (OUTPATIENT)
Age: 68
End: 2023-09-08

## 2023-09-11 ENCOUNTER — TRANSCRIPTION ENCOUNTER (OUTPATIENT)
Age: 68
End: 2023-09-11

## 2023-09-15 ASSESSMENT — KOOS JR
GOING UP OR DOWN STAIRS: MILD
RISING FROM SITTING: MILD
HOW SEVERE IS YOUR KNEE STIFFNESS AFTER FIRST WAKING IN MORNING: MODERATE
STANDING UPRIGHT: MODERATE
IMPORTED KOOS JR SCORE: 50.01
TWISING OR PIVOTING ON KNEE: MODERATE
RISING FROM SITTING: MILD
IMPORTED LATERALITY: LEFT
RISING FROM SITTING: SEVERE
KOOS JR RAW SCORE: 5
TWISING OR PIVOTING ON KNEE: MILD
GOING UP OR DOWN STAIRS: SEVERE
STRAIGHTENING KNEE FULLY: MILD
HOW SEVERE IS YOUR KNEE STIFFNESS AFTER FIRST WAKING IN MORNING: MODERATE
STANDING UPRIGHT: MODERATE
KOOS JR RAW SCORE: 5
IMPORTED KOOS JR SCORE: 50.01
HOW SEVERE IS YOUR KNEE STIFFNESS AFTER FIRST WAKING IN MORNING: SEVERE
RISING FROM SITTING: MILD
STANDING UPRIGHT: MODERATE
KOOS JR RAW SCORE: 16
GOING UP OR DOWN STAIRS: MILD
IMPORTED FORM: YES
TWISING OR PIVOTING ON KNEE: MILD
KOOS JR RAW SCORE: 5
HOW SEVERE IS YOUR KNEE STIFFNESS AFTER FIRST WAKING IN MORNING: MILD
IMPORTED KOOS JR SCORE: 50.01
BENDING TO THE FLOOR TO PICK UP OBJECT: MILD
GOING UP OR DOWN STAIRS: MILD
HOW SEVERE IS YOUR KNEE STIFFNESS AFTER FIRST WAKING IN MORNING: MODERATE
STRAIGHTENING KNEE FULLY: MILD
IMPORTED FORM: YES
RISING FROM SITTING: SEVERE
STRAIGHTENING KNEE FULLY: MILD
GOING UP OR DOWN STAIRS: MILD
GOING UP OR DOWN STAIRS: SEVERE
RISING FROM SITTING: SEVERE
IMPORTED KOOS JR SCORE: 47.49
GOING UP OR DOWN STAIRS: MILD
KOOS JR RAW SCORE: 9
IMPORTED LATERALITY: LEFT
STRAIGHTENING KNEE FULLY: MILD
GOING UP OR DOWN STAIRS: MILD
IMPORTED KOOS JR SCORE: 73.34
KOOS JR RAW SCORE: 16
GOING UP OR DOWN STAIRS: SEVERE
IMPORTED FORM: YES
STRAIGHTENING KNEE FULLY: MILD
HOW SEVERE IS YOUR KNEE STIFFNESS AFTER FIRST WAKING IN MORNING: MILD
RISING FROM SITTING: MODERATE
KOOS JR RAW SCORE: 5
TWISING OR PIVOTING ON KNEE: MILD
TWISING OR PIVOTING ON KNEE: MILD
STANDING UPRIGHT: SEVERE
RISING FROM SITTING: SEVERE
IMPORTED KOOS JR SCORE: 50.01
BENDING TO THE FLOOR TO PICK UP OBJECT: MODERATE
GOING UP OR DOWN STAIRS: MILD
STANDING UPRIGHT: MILD
HOW SEVERE IS YOUR KNEE STIFFNESS AFTER FIRST WAKING IN MORNING: MODERATE
RISING FROM SITTING: MILD
STRAIGHTENING KNEE FULLY: MILD
STANDING UPRIGHT: MODERATE
HOW SEVERE IS YOUR KNEE STIFFNESS AFTER FIRST WAKING IN MORNING: MODERATE
TWISING OR PIVOTING ON KNEE: MILD
RISING FROM SITTING: MILD
BENDING TO THE FLOOR TO PICK UP OBJECT: MODERATE
TWISING OR PIVOTING ON KNEE: MILD
STANDING UPRIGHT: MILD
IMPORTED KOOS JR SCORE: 50.01
IMPORTED LATERALITY: LEFT
STRAIGHTENING KNEE FULLY: MILD
STRAIGHTENING KNEE FULLY: MILD
TWISING OR PIVOTING ON KNEE: MODERATE
TWISING OR PIVOTING ON KNEE: MODERATE
HOW SEVERE IS YOUR KNEE STIFFNESS AFTER FIRST WAKING IN MORNING: MODERATE
TWISING OR PIVOTING ON KNEE: MODERATE
STANDING UPRIGHT: MODERATE
TWISING OR PIVOTING ON KNEE: MILD
KOOS JR RAW SCORE: 15
RISING FROM SITTING: MODERATE
IMPORTED KOOS JR SCORE: 63.78
GOING UP OR DOWN STAIRS: SEVERE
KOOS JR RAW SCORE: 16
IMPORTED LATERALITY: RIGHT
TWISING OR PIVOTING ON KNEE: MODERATE
GOING UP OR DOWN STAIRS: MILD
IMPORTED KOOS JR SCORE: 50.01
RISING FROM SITTING: SEVERE
HOW SEVERE IS YOUR KNEE STIFFNESS AFTER FIRST WAKING IN MORNING: MILD
TWISING OR PIVOTING ON KNEE: MODERATE
IMPORTED KOOS JR SCORE: 63.78
GOING UP OR DOWN STAIRS: SEVERE
IMPORTED FORM: YES
RISING FROM SITTING: EXTREME
GOING UP OR DOWN STAIRS: MODERATE
IMPORTED KOOS JR SCORE: 73.34
TWISING OR PIVOTING ON KNEE: MILD
IMPORTED KOOS JR SCORE: 50.01
IMPORTED LATERALITY: RIGHT
BENDING TO THE FLOOR TO PICK UP OBJECT: MODERATE
GOING UP OR DOWN STAIRS: SEVERE
STANDING UPRIGHT: MODERATE
STRAIGHTENING KNEE FULLY: MILD
IMPORTED FORM: YES
STANDING UPRIGHT: MODERATE
RISING FROM SITTING: SEVERE
STRAIGHTENING KNEE FULLY: MILD
BENDING TO THE FLOOR TO PICK UP OBJECT: MODERATE
IMPORTED KOOS JR SCORE: 47.49
RISING FROM SITTING: EXTREME
TWISING OR PIVOTING ON KNEE: MODERATE
TWISING OR PIVOTING ON KNEE: MILD
BENDING TO THE FLOOR TO PICK UP OBJECT: MODERATE
RISING FROM SITTING: SEVERE
HOW SEVERE IS YOUR KNEE STIFFNESS AFTER FIRST WAKING IN MORNING: MILD
IMPORTED LATERALITY: LEFT
HOW SEVERE IS YOUR KNEE STIFFNESS AFTER FIRST WAKING IN MORNING: MILD
IMPORTED FORM: YES
STRAIGHTENING KNEE FULLY: MILD
GOING UP OR DOWN STAIRS: SEVERE
RISING FROM SITTING: MILD
TWISING OR PIVOTING ON KNEE: MODERATE
HOW SEVERE IS YOUR KNEE STIFFNESS AFTER FIRST WAKING IN MORNING: SEVERE
HOW SEVERE IS YOUR KNEE STIFFNESS AFTER FIRST WAKING IN MORNING: MILD
HOW SEVERE IS YOUR KNEE STIFFNESS AFTER FIRST WAKING IN MORNING: MILD
STANDING UPRIGHT: MODERATE
IMPORTED KOOS JR SCORE: 57.14
IMPORTED KOOS JR SCORE: 73.34
IMPORTED LATERALITY: RIGHT
BENDING TO THE FLOOR TO PICK UP OBJECT: MILD
KOOS JR RAW SCORE: 15
IMPORTED FORM: YES
GOING UP OR DOWN STAIRS: SEVERE
KOOS JR RAW SCORE: 15
HOW SEVERE IS YOUR KNEE STIFFNESS AFTER FIRST WAKING IN MORNING: MILD
GOING UP OR DOWN STAIRS: MILD
IMPORTED KOOS JR SCORE: 57.14
STANDING UPRIGHT: SEVERE
IMPORTED KOOS JR SCORE: 50.01
KOOS JR RAW SCORE: 15
BENDING TO THE FLOOR TO PICK UP OBJECT: MODERATE
GOING UP OR DOWN STAIRS: MILD
IMPORTED LATERALITY: RIGHT
IMPORTED LATERALITY: LEFT
IMPORTED KOOS JR SCORE: 73.34
IMPORTED FORM: YES
HOW SEVERE IS YOUR KNEE STIFFNESS AFTER FIRST WAKING IN MORNING: MILD
BENDING TO THE FLOOR TO PICK UP OBJECT: MODERATE
RISING FROM SITTING: SEVERE
IMPORTED FORM: YES
BENDING TO THE FLOOR TO PICK UP OBJECT: MODERATE
HOW SEVERE IS YOUR KNEE STIFFNESS AFTER FIRST WAKING IN MORNING: MILD
TWISING OR PIVOTING ON KNEE: MODERATE
GOING UP OR DOWN STAIRS: SEVERE
RISING FROM SITTING: MILD
BENDING TO THE FLOOR TO PICK UP OBJECT: MODERATE
TWISING OR PIVOTING ON KNEE: MILD
STRAIGHTENING KNEE FULLY: MILD
IMPORTED LATERALITY: RIGHT
IMPORTED KOOS JR SCORE: 50.01
KOOS JR RAW SCORE: 5
IMPORTED FORM: YES
IMPORTED FORM: YES
GOING UP OR DOWN STAIRS: MODERATE
HOW SEVERE IS YOUR KNEE STIFFNESS AFTER FIRST WAKING IN MORNING: MODERATE
IMPORTED KOOS JR SCORE: 73.34
IMPORTED KOOS JR SCORE: 73.34
IMPORTED LATERALITY: RIGHT
TWISING OR PIVOTING ON KNEE: MILD
STANDING UPRIGHT: SEVERE
IMPORTED KOOS JR SCORE: 73.34
GOING UP OR DOWN STAIRS: SEVERE
KOOS JR RAW SCORE: 5
KOOS JR RAW SCORE: 15
HOW SEVERE IS YOUR KNEE STIFFNESS AFTER FIRST WAKING IN MORNING: MILD
RISING FROM SITTING: SEVERE
IMPORTED KOOS JR SCORE: 73.34
TWISING OR PIVOTING ON KNEE: MILD
HOW SEVERE IS YOUR KNEE STIFFNESS AFTER FIRST WAKING IN MORNING: MODERATE
RISING FROM SITTING: MILD
STANDING UPRIGHT: MODERATE
IMPORTED LATERALITY: RIGHT
BENDING TO THE FLOOR TO PICK UP OBJECT: MODERATE
RISING FROM SITTING: SEVERE
IMPORTED KOOS JR SCORE: 47.49
IMPORTED FORM: YES
IMPORTED KOOS JR SCORE: 50.01
STRAIGHTENING KNEE FULLY: MILD
KOOS JR RAW SCORE: 12
KOOS JR RAW SCORE: 5
KOOS JR RAW SCORE: 12
RISING FROM SITTING: MILD
RISING FROM SITTING: SEVERE
IMPORTED KOOS JR SCORE: 57.14
STANDING UPRIGHT: MODERATE
STRAIGHTENING KNEE FULLY: MILD
RISING FROM SITTING: SEVERE
KOOS JR RAW SCORE: 5
IMPORTED KOOS JR SCORE: 63.78
IMPORTED KOOS JR SCORE: 73.34
GOING UP OR DOWN STAIRS: MILD
KOOS JR RAW SCORE: 9
HOW SEVERE IS YOUR KNEE STIFFNESS AFTER FIRST WAKING IN MORNING: MODERATE
GOING UP OR DOWN STAIRS: SEVERE
TWISING OR PIVOTING ON KNEE: MILD
IMPORTED FORM: YES
KOOS JR RAW SCORE: 12
IMPORTED LATERALITY: RIGHT
RISING FROM SITTING: MILD
KOOS JR RAW SCORE: 5
TWISING OR PIVOTING ON KNEE: MODERATE
KOOS JR RAW SCORE: 15
TWISING OR PIVOTING ON KNEE: MODERATE
STANDING UPRIGHT: MODERATE
HOW SEVERE IS YOUR KNEE STIFFNESS AFTER FIRST WAKING IN MORNING: MILD
KOOS JR RAW SCORE: 9
RISING FROM SITTING: MILD
KOOS JR RAW SCORE: 5
IMPORTED FORM: YES
RISING FROM SITTING: MODERATE
KOOS JR RAW SCORE: 15
HOW SEVERE IS YOUR KNEE STIFFNESS AFTER FIRST WAKING IN MORNING: MODERATE
RISING FROM SITTING: EXTREME
KOOS JR RAW SCORE: 15
HOW SEVERE IS YOUR KNEE STIFFNESS AFTER FIRST WAKING IN MORNING: MODERATE
GOING UP OR DOWN STAIRS: SEVERE
TWISING OR PIVOTING ON KNEE: MILD
IMPORTED LATERALITY: LEFT
IMPORTED LATERALITY: RIGHT
BENDING TO THE FLOOR TO PICK UP OBJECT: MODERATE
GOING UP OR DOWN STAIRS: MODERATE
KOOS JR RAW SCORE: 15
IMPORTED KOOS JR SCORE: 50.01
IMPORTED KOOS JR SCORE: 73.34
HOW SEVERE IS YOUR KNEE STIFFNESS AFTER FIRST WAKING IN MORNING: MODERATE
IMPORTED KOOS JR SCORE: 73.34
IMPORTED FORM: YES
BENDING TO THE FLOOR TO PICK UP OBJECT: MODERATE
GOING UP OR DOWN STAIRS: SEVERE
BENDING TO THE FLOOR TO PICK UP OBJECT: MODERATE
HOW SEVERE IS YOUR KNEE STIFFNESS AFTER FIRST WAKING IN MORNING: MODERATE
IMPORTED LATERALITY: RIGHT
HOW SEVERE IS YOUR KNEE STIFFNESS AFTER FIRST WAKING IN MORNING: MILD
TWISING OR PIVOTING ON KNEE: MILD
BENDING TO THE FLOOR TO PICK UP OBJECT: MODERATE
IMPORTED FORM: YES
RISING FROM SITTING: SEVERE
KOOS JR RAW SCORE: 15
STANDING UPRIGHT: MODERATE
HOW SEVERE IS YOUR KNEE STIFFNESS AFTER FIRST WAKING IN MORNING: MILD
STRAIGHTENING KNEE FULLY: MILD
KOOS JR RAW SCORE: 5
BENDING TO THE FLOOR TO PICK UP OBJECT: MODERATE
KOOS JR RAW SCORE: 15
STRAIGHTENING KNEE FULLY: MILD
TWISING OR PIVOTING ON KNEE: MILD
HOW SEVERE IS YOUR KNEE STIFFNESS AFTER FIRST WAKING IN MORNING: MODERATE
TWISING OR PIVOTING ON KNEE: MILD
GOING UP OR DOWN STAIRS: SEVERE
HOW SEVERE IS YOUR KNEE STIFFNESS AFTER FIRST WAKING IN MORNING: MODERATE
GOING UP OR DOWN STAIRS: SEVERE
RISING FROM SITTING: MILD
BENDING TO THE FLOOR TO PICK UP OBJECT: MILD
IMPORTED KOOS JR SCORE: 50.01
TWISING OR PIVOTING ON KNEE: MILD
IMPORTED FORM: YES
KOOS JR RAW SCORE: 15
STRAIGHTENING KNEE FULLY: MILD
GOING UP OR DOWN STAIRS: MILD
HOW SEVERE IS YOUR KNEE STIFFNESS AFTER FIRST WAKING IN MORNING: SEVERE
RISING FROM SITTING: SEVERE
IMPORTED LATERALITY: RIGHT
IMPORTED FORM: YES
STANDING UPRIGHT: MODERATE
TWISING OR PIVOTING ON KNEE: MODERATE
HOW SEVERE IS YOUR KNEE STIFFNESS AFTER FIRST WAKING IN MORNING: MILD
IMPORTED KOOS JR SCORE: 73.34
STRAIGHTENING KNEE FULLY: MILD
RISING FROM SITTING: SEVERE
KOOS JR RAW SCORE: 5
STANDING UPRIGHT: MILD
IMPORTED LATERALITY: RIGHT

## 2023-09-20 ENCOUNTER — APPOINTMENT (OUTPATIENT)
Dept: PAIN MANAGEMENT | Facility: CLINIC | Age: 68
End: 2023-09-20
Payer: COMMERCIAL

## 2023-09-20 VITALS
TEMPERATURE: 98.5 F | OXYGEN SATURATION: 97 % | DIASTOLIC BLOOD PRESSURE: 102 MMHG | HEIGHT: 72 IN | RESPIRATION RATE: 17 BRPM | WEIGHT: 221 LBS | HEART RATE: 87 BPM | BODY MASS INDEX: 29.93 KG/M2 | SYSTOLIC BLOOD PRESSURE: 166 MMHG

## 2023-09-20 DIAGNOSIS — Z96.651 PRESENCE OF RIGHT ARTIFICIAL KNEE JOINT: ICD-10-CM

## 2023-09-20 DIAGNOSIS — Z96.652 PRESENCE OF LEFT ARTIFICIAL KNEE JOINT: ICD-10-CM

## 2023-09-20 DIAGNOSIS — Z96.651 AFTERCARE FOLLOWING JOINT REPLACEMENT SURGERY: ICD-10-CM

## 2023-09-20 DIAGNOSIS — Z47.1 AFTERCARE FOLLOWING JOINT REPLACEMENT SURGERY: ICD-10-CM

## 2023-09-20 DIAGNOSIS — Z96.652 AFTERCARE FOLLOWING JOINT REPLACEMENT SURGERY: ICD-10-CM

## 2023-09-20 DIAGNOSIS — M76.891 OTHER SPECIFIED ENTHESOPATHIES OF RIGHT LOWER LIMB, EXCLUDING FOOT: ICD-10-CM

## 2023-09-20 PROCEDURE — 99204 OFFICE O/P NEW MOD 45 MIN: CPT

## 2023-09-26 ENCOUNTER — OUTPATIENT (OUTPATIENT)
Dept: OUTPATIENT SERVICES | Facility: HOSPITAL | Age: 68
LOS: 1 days | End: 2023-09-26
Payer: COMMERCIAL

## 2023-09-26 VITALS
WEIGHT: 218.04 LBS | SYSTOLIC BLOOD PRESSURE: 172 MMHG | DIASTOLIC BLOOD PRESSURE: 104 MMHG | RESPIRATION RATE: 16 BRPM | TEMPERATURE: 98 F | HEIGHT: 70 IN | OXYGEN SATURATION: 97 % | HEART RATE: 72 BPM

## 2023-09-26 DIAGNOSIS — Z47.1 AFTERCARE FOLLOWING JOINT REPLACEMENT SURGERY: ICD-10-CM

## 2023-09-26 DIAGNOSIS — Z98.890 OTHER SPECIFIED POSTPROCEDURAL STATES: Chronic | ICD-10-CM

## 2023-09-26 DIAGNOSIS — Z96.651 PRESENCE OF RIGHT ARTIFICIAL KNEE JOINT: Chronic | ICD-10-CM

## 2023-09-26 DIAGNOSIS — Z96.652 PRESENCE OF LEFT ARTIFICIAL KNEE JOINT: Chronic | ICD-10-CM

## 2023-09-26 DIAGNOSIS — Z96.611 PRESENCE OF RIGHT ARTIFICIAL SHOULDER JOINT: Chronic | ICD-10-CM

## 2023-09-26 DIAGNOSIS — S82.209A UNSPECIFIED FRACTURE OF SHAFT OF UNSPECIFIED TIBIA, INITIAL ENCOUNTER FOR CLOSED FRACTURE: Chronic | ICD-10-CM

## 2023-09-26 DIAGNOSIS — I10 ESSENTIAL (PRIMARY) HYPERTENSION: ICD-10-CM

## 2023-09-26 DIAGNOSIS — R06.83 SNORING: ICD-10-CM

## 2023-09-26 LAB
ANION GAP SERPL CALC-SCNC: 14 MMOL/L — SIGNIFICANT CHANGE UP (ref 7–14)
BUN SERPL-MCNC: 18 MG/DL — SIGNIFICANT CHANGE UP (ref 7–23)
CALCIUM SERPL-MCNC: 9.5 MG/DL — SIGNIFICANT CHANGE UP (ref 8.4–10.5)
CHLORIDE SERPL-SCNC: 101 MMOL/L — SIGNIFICANT CHANGE UP (ref 98–107)
CO2 SERPL-SCNC: 25 MMOL/L — SIGNIFICANT CHANGE UP (ref 22–31)
CREAT SERPL-MCNC: 0.94 MG/DL — SIGNIFICANT CHANGE UP (ref 0.5–1.3)
EGFR: 89 ML/MIN/1.73M2 — SIGNIFICANT CHANGE UP
GLUCOSE SERPL-MCNC: 98 MG/DL — SIGNIFICANT CHANGE UP (ref 70–99)
HCT VFR BLD CALC: 44.7 % — SIGNIFICANT CHANGE UP (ref 39–50)
HGB BLD-MCNC: 15 G/DL — SIGNIFICANT CHANGE UP (ref 13–17)
MCHC RBC-ENTMCNC: 30.7 PG — SIGNIFICANT CHANGE UP (ref 27–34)
MCHC RBC-ENTMCNC: 33.6 GM/DL — SIGNIFICANT CHANGE UP (ref 32–36)
MCV RBC AUTO: 91.4 FL — SIGNIFICANT CHANGE UP (ref 80–100)
NRBC # BLD: 0 /100 WBCS — SIGNIFICANT CHANGE UP (ref 0–0)
NRBC # FLD: 0 K/UL — SIGNIFICANT CHANGE UP (ref 0–0)
PLATELET # BLD AUTO: 229 K/UL — SIGNIFICANT CHANGE UP (ref 150–400)
POTASSIUM SERPL-MCNC: 4.4 MMOL/L — SIGNIFICANT CHANGE UP (ref 3.5–5.3)
POTASSIUM SERPL-SCNC: 4.4 MMOL/L — SIGNIFICANT CHANGE UP (ref 3.5–5.3)
RBC # BLD: 4.89 M/UL — SIGNIFICANT CHANGE UP (ref 4.2–5.8)
RBC # FLD: 13.2 % — SIGNIFICANT CHANGE UP (ref 10.3–14.5)
SODIUM SERPL-SCNC: 140 MMOL/L — SIGNIFICANT CHANGE UP (ref 135–145)
WBC # BLD: 7.21 K/UL — SIGNIFICANT CHANGE UP (ref 3.8–10.5)
WBC # FLD AUTO: 7.21 K/UL — SIGNIFICANT CHANGE UP (ref 3.8–10.5)

## 2023-09-26 PROCEDURE — 93010 ELECTROCARDIOGRAM REPORT: CPT

## 2023-09-26 RX ORDER — LOSARTAN POTASSIUM 100 MG/1
1 TABLET, FILM COATED ORAL
Qty: 0 | Refills: 0 | DISCHARGE

## 2023-09-26 RX ORDER — AMLODIPINE BESYLATE 2.5 MG/1
1 TABLET ORAL
Qty: 0 | Refills: 0 | DISCHARGE

## 2023-09-26 NOTE — H&P PST ADULT - MUSCULOSKELETAL COMMENTS
preop dx of aftercare following joint replacement surgery. see HPI bowed left leg. preop dx of aftercare following joint replacement surgery. see HPI

## 2023-09-26 NOTE — H&P PST ADULT - HISTORY OF PRESENT ILLNESS
69 y/o male presents to PST preop for genicular nerve block. Pt s/p bilateral total knee replacement 15 years ago with revisions on both knees in 2017 and 2018. Pt reports bilateral knee pain, worse on the right then left over the last 8 months. He states despite conservative measures (physical therapy, acupuncture, opioid and non opioid pain meds), right knee pain persists.

## 2023-09-26 NOTE — H&P PST ADULT - NEGATIVE ENMT SYMPTOMS
no ear pain/no tinnitus/no vertigo/no nasal congestion/no abnormal taste sensation/no gum bleeding/no dry mouth/no throat pain/no dysphagia

## 2023-09-26 NOTE — H&P PST ADULT - NSICDXPASTSURGICALHX_GEN_ALL_CORE_FT
PAST SURGICAL HISTORY:  Fracture, tibia and fibula     H/O total knee replacement, left 2010    H/O total knee replacement, right 2009 revision x2    S/P carpal tunnel release     S/P revision of total knee, left     S/P revision of total knee, right     S/P shoulder replacement, right

## 2023-09-26 NOTE — H&P PST ADULT - PROBLEM SELECTOR PLAN 2
pt will take blood pressure meds amlodipine and losartan AM of surgery as prescribed    Medical evaluation requested for elevated /104  email sent to surgeon regarding request for preop medical eval

## 2023-09-26 NOTE — H&P PST ADULT - PROBLEM SELECTOR PLAN 1
preop for genicular nerve block on 9/29/23  preop instructions given, pt verbalized understanding  GI prophylaxis and chlorhexidine wash provided  cbc, bmp pending

## 2023-09-26 NOTE — H&P PST ADULT - FUNCTIONAL STATUS
Activity: walking and climbs stairs at work, physical therapy exercises daily , rides recumbent bike  20-25 minutes every other day   Symptoms: None  Mets: 5

## 2023-09-26 NOTE — H&P PST ADULT - PROBLEM SELECTOR PROBLEM 1
Initial Anesthesia Post-op Note    Patient: Carmel Quinn  Procedure(s) Performed: COLONOSCOPYUPPER GI ENDOSCOPY  Anesthesia type: Monitor Anesthesia Care    Vital Last Value   Temperature 36.9 °C (98.5 °F) (09/18/17 1120)   Pulse 102 (09/18/17 1120)   Respiratory Rate 16 (09/18/17 1120)   Non-Invasive   Blood Pressure (!) 160/93 (09/18/17 1120)   Arterial  Blood Pressure     Pulse Oximetry 100 % (09/18/17 1120)     Last 24 I/O:   Intake/Output Summary (Last 24 hours) at 09/18/17 1312  Last data filed at 09/18/17 1308   Gross per 24 hour   Intake              850 ml   Output                0 ml   Net              850 ml       PATIENT LOCATION: Day Surgery  POST-OP VITAL SIGNS: stable  LEVEL OF CONSCIOUSNESS: sedated  RESPIRATORY STATUS: spontaneous ventilation  CARDIOVASCULAR: blood pressure returned to baseline  HYDRATION: euvolemic    PAIN MANAGEMENT: well controlled  NAUSEA: None  AIRWAY PATENCY: patent  POST-OP ASSESSMENT: no complications  COMPLICATIONS: none  HANDOFF:  Handoff to receiving nurse was performed and questions were answered (see post-op vitals and initial note)       Aftercare following joint replacement surgery

## 2023-09-26 NOTE — H&P PST ADULT - MUSCULOSKELETAL
right knee/decreased ROM due to pain/strength 5/5 bilateral upper extremities/strength 5/5 bilateral lower extremities/back exam details…

## 2023-09-26 NOTE — H&P PST ADULT - NSICDXPASTMEDICALHX_GEN_ALL_CORE_FT
PAST MEDICAL HISTORY:  Aftercare following joint replacement surgery     Anxiety     Fracture left leg fractured tibia/fibula (2011)    HTN (hypertension)     Pain in left knee     Pain in right knee     Presence of left artificial knee joint

## 2023-09-28 PROBLEM — M25.561 PAIN IN RIGHT KNEE: Chronic | Status: ACTIVE | Noted: 2023-09-26

## 2023-09-28 PROBLEM — Z96.652 PRESENCE OF LEFT ARTIFICIAL KNEE JOINT: Chronic | Status: ACTIVE | Noted: 2023-09-26

## 2023-09-28 PROBLEM — M25.562 PAIN IN LEFT KNEE: Chronic | Status: ACTIVE | Noted: 2023-09-26

## 2023-09-28 PROBLEM — Z47.1 AFTERCARE FOLLOWING JOINT REPLACEMENT SURGERY: Chronic | Status: ACTIVE | Noted: 2023-09-26

## 2023-10-17 VITALS
HEIGHT: 70 IN | SYSTOLIC BLOOD PRESSURE: 161 MMHG | RESPIRATION RATE: 18 BRPM | DIASTOLIC BLOOD PRESSURE: 81 MMHG | HEART RATE: 60 BPM | OXYGEN SATURATION: 97 % | WEIGHT: 218.04 LBS | TEMPERATURE: 98 F

## 2023-10-18 ENCOUNTER — TRANSCRIPTION ENCOUNTER (OUTPATIENT)
Age: 68
End: 2023-10-18

## 2023-10-18 ENCOUNTER — APPOINTMENT (OUTPATIENT)
Dept: PAIN MANAGEMENT | Facility: AMBULATORY SURGERY CENTER | Age: 68
End: 2023-10-18

## 2023-10-18 ENCOUNTER — OUTPATIENT (OUTPATIENT)
Dept: OUTPATIENT SERVICES | Facility: HOSPITAL | Age: 68
LOS: 1 days | Discharge: HOME | End: 2023-10-18
Payer: COMMERCIAL

## 2023-10-18 VITALS
DIASTOLIC BLOOD PRESSURE: 74 MMHG | HEART RATE: 61 BPM | OXYGEN SATURATION: 100 % | TEMPERATURE: 97 F | SYSTOLIC BLOOD PRESSURE: 129 MMHG | RESPIRATION RATE: 17 BRPM

## 2023-10-18 DIAGNOSIS — M25.561 PAIN IN RIGHT KNEE: ICD-10-CM

## 2023-10-18 DIAGNOSIS — S82.209A UNSPECIFIED FRACTURE OF SHAFT OF UNSPECIFIED TIBIA, INITIAL ENCOUNTER FOR CLOSED FRACTURE: Chronic | ICD-10-CM

## 2023-10-18 DIAGNOSIS — Z96.611 PRESENCE OF RIGHT ARTIFICIAL SHOULDER JOINT: Chronic | ICD-10-CM

## 2023-10-18 DIAGNOSIS — M76.891 OTHER SPECIFIED ENTHESOPATHIES OF RIGHT LOWER LIMB, EXCLUDING FOOT: ICD-10-CM

## 2023-10-18 DIAGNOSIS — Z96.652 PRESENCE OF LEFT ARTIFICIAL KNEE JOINT: Chronic | ICD-10-CM

## 2023-10-18 DIAGNOSIS — Z96.651 PRESENCE OF RIGHT ARTIFICIAL KNEE JOINT: Chronic | ICD-10-CM

## 2023-10-18 DIAGNOSIS — Z98.890 OTHER SPECIFIED POSTPROCEDURAL STATES: Chronic | ICD-10-CM

## 2023-10-18 PROCEDURE — 64454 NJX AA&/STRD GNCLR NRV BRNCH: CPT

## 2023-10-18 NOTE — ASU DISCHARGE PLAN (ADULT/PEDIATRIC) - MODE OF TRANSPORTATION
[facility] INPATIENT ENCOUNTER  TRAUMA DISCHARGE SUMMARY NOTE    ADMISSION DATE:  1/27/2019  DISCHARGE DATE:  01/29/19  DISCHARGING PHYSICIAN:  Dr Ian Garcia    DISCHARGE DIAGNOSIS:     Active Hospital Problems    Diagnosis Date Noted   • Syncopal episodes 01/27/2019     Priority: Low   • Closed fracture of right tibia and fibula 01/27/2019     Priority: Low     1/27/19-Dr. Ramírez. Tibial rodding with [employer] titanium rosamaria suprapatellar approach with proximal distal interlocking. ORIF Lorenzana C lateral malleolar fracture     • Osteopenia of hip 01/27/2019     Priority: Low     Bone DEXA scan February 2018:   According to above results and the ISCD criteria, and the lowest above  T-scores of -2.4 in the hips,  this patient is considered to have  OSTEOPENIA.  Fracture risk is moderate.  Treatment is advised.  Followup  bone densitometry is recommended.      • Sinus tachycardia 06/10/2016     Priority: Low   • Breast cancer right Stage 2A T1c N1 M0, ER/IA pos, Her2 pos (dx 5/2014) 05/28/2012     Priority: Low     Right sided Clinical Stage 2B T3 N0 M0 moderately differentiated ductal carcinoma (5.1 cm MRI) with high grade DCIS with comedo necrosis. ER positive. IA weak positive. Her2 positive. Underwent right modified radical mastectomy (2/16 nodes) and left simple mastectomy with sentinel lymph node biopsy (0/1) on 5/21/2014 (Dr. Martins). Planning AC with taxol/Herceptin (Dr. Talley). She was initially planning tissue reconstruction after chest wall radiation, however, given smaller than anticipated size of primary, she may not require chest wall radiation. Met with Dr. Navarro and elected not to pursue radiation therapy. Family history of BRCA 2 [id number] variant in 4 cousins (daughters of maternal uncle).    MAMMOGRAMS (3/27/2014):  bilateral screening - diagnosis of right sided cancer, left side okay  MRI (4/16/2014): Malignancy within the outer right breast at the 8:00 position (measures 5.1 x 1.6 x 2.2 cm). There is no MR  evidence for multicentric or contralateral disease. There is no significant adenopathy.  CXR (5/7/2014):  No evidence of metastatic disease.  LFTs (4/30/2014):  Unremarkable.  CBC (4/30/2014):  Unremarkable.  Ca 27.29 (4/30/2014):  23.0    Tumor board - 5/13/2014 - ongoing plans for therapy - discussion about needs for AC if nodes negative  Tumor board - 5/28/2014 - radiation needs will require discussion, consult to follow chemo; Planning AC with taxol/herceptin for adjuvant therapy     • Tobacco abuse      Priority: Low        DISCHARGE DISPOSITION:    Home with home PT/OT    CONDITION AT DISCHARGE:    good    CODE STATUS: Full Resuscitation    DISCHARGE INSTRUCTIONS:    DISCHARGE MEDICATIONS:  See Discharge Medication Reconciliation List  FOLLOW-UP PROVIDER(S):    Follow-up Information     Follow up With Specialties Details Why Contact Info    Awilda Duenas PA-C Physician Assistant Schedule an appointment as soon as possible for a visit in 2 weeks  [address]  [facility] [address]  [phone]      Dawson Ramírez MD Orthopedic Surgery In 10 days Fracture follow-up, Progress from surgery [id number] [place] [id number] [phone]           DIET:  regular diet  ACTIVITY:  activity as tolerated and weight bearing as tolerated to the RLE  WOUND CARE:  keep wound clean and dry, ice or heat to area for comfort. Ok to shower. Silverlon dressing over incision sites.     REASON FOR ADMISSION:    Vernell Thomas is a 58 year old female who was admitted on transfer from [facility] on  1/27/2019 after a syncopal episode and dizziness. She fell from standing height in her home. She did not recall the events of her fall. She was noted to have a right tib-fib fracture in the ED and was transferred for definitive cares.    HOSPITAL COURSE:    Ms. Thomas was admitted on transfer to inpatient unit. Orthopedic surgery was consulted. Hospitalist was consulted for further syncopal work up. She underwent  surgery as listed below. Following surgery, her pain was well controlled, and she was transitioned to oral narcotics. She worked with PT/OT. She is discharged with medication changes including stopping her home lisinopril, and decreased dose of metoprol. She was also instructed giving herself lovenox injections prior to discharge. Once cleared from a medical standpoint, she was discharged to home with home PT/OT with a holter monitor in place.     OBJECTIVE:    VITALS:  Temp:  [97.4 °F (36.3 °C)-98.5 °F (36.9 °C)] 98.5 °F (36.9 °C)  Pulse:  [] 115  Resp:  [16-18] 18  BP: ()/(55-73) 98/55     PHYSICAL EXAM:    Constitutional:  The patient is alert, oriented and cooperative.   Integument:  Warm. Dry. No erythema. No rash.    Cardiac: tachycardic, regular rhythm  Lungs: clear bilaterally, normal respiratory effort  Extremities: warm and well perfused, sensation intact to light touch. Edema to the RLE.  Incisions: silverlon dressings intact to RLE.  Neuro: awake, alert and oriented.     CONSULTS:    Hospitalist, Dr. CHI Livingston  Orthopaedics, Dr. ELI Ramírez    PROCEDURES:    1/27/2019: Intramedullary tibial rodding with proximal and distal interlocking, suprapatellar approach and ORIF lateral malleolus with Dr. ELI Ramírez    SIGNIFICANT DIAGNOSTIC STUDIES:    Ct Head Brain  Result Date: 1/27/2019  EXAM:  CT HEAD WO CONTRAST CLINICAL INDICATION: 58 years-old Female, presenting history of SYNCOPE/FAINTING. COMPARISON:  3/28/2016 TECHNIQUE:  Routine noncontrast head CT spanning cranial vertex through foramen magnum. Coronal and sagittal reformats. FINDINGS:     No acute intracranial hemorrhage, mass effect, midline shift, or pathologic extra-axial fluid collection identified.  Sulci and ventricles are moderately enlarged in keeping with brain volume loss within broad limits of normal for age.  Mild scattered periventricular low-attenuation changes are nonspecific, but of the type typically ascribed to microvascular  ischemic change in a patient of this age.  No CT findings of evolving infarct in a major intracranial vascular territory, although MRI is more sensitive for detection of acute ischemia.   Visualized paranasal sinuses and mastoid air cells are clear.  The calvarium and skull base are unremarkable.         IMPRESSION:  No acute intracranial findings.  Stable age-related volume loss and microvascular ischemic changes.  //Location Code: [facility]     Xr Chest Ap Or Pa    Result Date: 1/27/2019  CHEST X RAY, AP PORTABLE INDICATION: Chest pain. COMPARISON: Previous including 6/8/2016. FINDINGS: No interval consolidation, pneumothoraces, or pleural effusions bilaterally. The heart size is stable. The mediastinum is stable. Subcutaneous soft tissues and osseous structures are stable.     IMPRESSION: 1. No acute cardiopulmonary process. No significant change.     Xr Tibia Fibula 2 View Right    Result Date: 1/27/2019  XR TIBIA FIBULA 2 VW RIGHT HISTORY:  Fracture secondary to fall COMPARISONS:  None.     FINDINGS/IMPRESSION: There is an oblique mildly displaced and angulated fracture through the right mid tibia. There is significant lateral rotation of the distal tibia and ankle relative to the proximal calf. There is also a mildly displaced fracture seen more distally within the right tibia which may be a spiral extension of the more proximal fracture. There is a mildly displaced and angulated fracture through the distal right fibula just above the ankle mortise. No dislocation.        Ct Angiogram Head And Neck    Result Date: 1/28/2019  EXAM:  CT ANGIOGRAM HEAD NECK W WO CONTRAST CLINICAL INDICATION: 58 years-old Female, presenting history of VERTIGO. COMPARISON:  January 27, 2019 head CT TECHNIQUE:  Using a multidetector, multislice helical scanner, routine noncontrast CT head was obtained, followed by  CTA of the intracranial and extracranial circulation after administration of 90 cc of Isovue-370 intravenously.   Subsequently, venous phase CT head was performed with standard technique.  MIP and 3D reconstructions are rendered and archived to PACS.  Determination of the degree of stenosis in the internal carotid arteries is obtained using measurements of distal internal carotid diameter as the denominator for stenosis measurement.  The method utilized is similar to that utilized in the WASID (Warfarin vs. Aspirin Symptomatic Intracranial Disease) trial.  If the degree of stenosis is greater than 30%, the actual percentage stenosis is given in the body of the report. FINDINGS: CT HEAD WITHOUT CONTRAST:  No acute intracranial hemorrhage, mass effect, midline shift, or pathologic extra-axial fluid collection identified.  Size and configuration of the ventricles and sulci are reflective of age-appropriate intracranial volume loss. Mild periventricular patchy low-attenuation is nonspecific, but of the type typically ascribed to microvascular ischemic change in a patient of this age.  No CT evidence for evolving infarct in a major intracranial vascular territory, although MRI is more sensitive for detection of acute ischemia.  Visualized calvarium, skull base, paranasal sinuses, and mastoid air cells are unremarkable.     CT HEAD WITH CONTRAST:  No pathologic intracranial enhancement.     CTA NECK: Arch:  The aortic arch is patent without dissection or aneurysm.     Great Vessels:  The great vessels demonstrate standard anatomic branching pattern.  Arch origins of the great vessels are widely patent. The innominate and subclavian arteries are patent.  Mild scattered calcific atherosclerotic plaque without evidence of significant stenosis. Right Common Carotid:  Patent.  Right Internal Carotid:  Patent.  Right External Carotid:  Patent.  Left Common Carotid:  Patent.  Left Internal Carotid:  Patent.  Left External Carotid: Patent.  Right Vertebral (V1-V3): Codominant vessel.  Patent origin and cervical segment. Left Verterbral  (V1-V3):  Codominant vessel.  Patent origin and cervical segment. Lung apices:  Mild patchy airspace disease in the left upper lobe. This could represent atelectasis or mild inflammatory disease. Osseous Structures: Mild multilevel degenerative disk and/or facet disease without evidence of fracture or severe stenosis.      Additional Significant Findings:  None CTA HEAD: ANTERIOR CIRCULATION: Right ICA:  Patent  Right MCA: Patent.  Right JANA: Patent  Left ICA:   Patent  Left MCA: Patent.  Left JANA: Patent  POSTERIOR CIRCULATION: Right Vertebral (V4):  Patent  Left Vertebral (V4):  Patent  Basilar artery: Patent  Right PCA: Patent  Left PCA: Arises as fetal origin via a large patent posterior communicator. PICA/AICA/SCA:  Patent     COLLATERAL CIRCULATION: Anterior communicator:  Patent Right Posterior communicator: Diminutive, patent. Left Posterior communicator: Patent, giving rise to fetal origin of ipsilateral posterior cerebral artery (normal anatomic variant). The major dural venous sinuses appear appropriately opacified.         IMPRESSION: CTA neck:  1.  No evidence of significant extracranial vascular stenosis or occlusion. 2.  Mild patchy airspace disease in the left upper lobe. This could represent atelectasis or mild multifocal inflammatory disease. 3.  Old healed left second, third and fourth rib fractures. CTA head:  1.  No evidence of hemodynamically significant intracranial stenosis, aneurysm, or vascular malformation.  2.  No acute intracranial pathology.  //Location Code: [facility]     Ct Tibia-fibula Right    Result Date: 1/27/2019  CT TIBIA-FIBULA WO CONTRAST RIGHT INDICATION: FRACTURE, TIB/FIB COMPARISON:  Plain films of the right tibia and fibula performed earlier today CT of the right tibia and fibula was performed without intravascular contrast.. FINDINGS:  CT of the right tibia-fibula was performed without intravascular contrast. Coronal and sagittal reconstructions were utilized. Findings are as  follows There is a small fracture posteriorly through the right fibular head. There is an oblique fracture through the distal shaft of the right tibia the junction of the middle and distal thirds. The 90 degrees of rotation demonstrated on the previous exam has been reduced. The distal osseous fragment is displaced anteriorly by approximately 10 mm. There is a second undisplaced fracture through the distal tibia located approximately 2 cm proximal to the articulating margin. In addition is a undisplaced fracture through the posterior malleolus. The medial malleolus appears intact. In addition to the small fracture posteriorly through the fibular head there is a comminuted fracture through the distal fibula. Slight angulation at the fracture site is noted with the apex of angulation pointing anteriorly. Fracture slightly comminuted. Medial malleolus and posterior malleolus appear intact     IMPRESSION:  Fractures of the tibia and fibula as outlined above        Results for orders placed or performed during the hospital encounter of 01/27/19   Urinalysis & Reflex Micro with Culture if Indicated   Result Value    COLOR YELLOW    APPEARANCE CLEAR    GLUCOSE(URINE) NEGATIVE    BILIRUBIN NEGATIVE    KETONES NEGATIVE    SPECIFIC GRAVITY 1.020    BLOOD NEGATIVE    pH 5.5    PROTEIN(URINE) NEGATIVE    UROBILINOGEN 0.2    NITRITE NEGATIVE    LEUKOCYTE ESTERASE NEGATIVE    SPECIMEN TYPE URINE, CATHETERIZED, STRAIGHT     Comment: CORRECTED ON 01/27 AT 1837: PREVIOUSLY REPORTED AS URINE, CLEAN CATCH/MIDSTREAM   Drug Abuse Screen Basic Urine   Result Value    U-Amphetamines NEGATIVE     Comment: Cutoff = 500ng/ml    U-Barbiturates NEGATIVE     Comment: Cutoff = 200 ng/mL    U-Benzodiazepines NEGATIVE     Comment: Cutoff = 200 ng/mL    Cannabinoids (THC) UA NEGATIVE     Comment: cutoff = 50 ng/mL    U-Cocaine/Metabolite NEGATIVE     Comment: cutoff = 150 ng/ml    Opiates UA POSITIVE (A)     Comment: cutoff = 300 ng/mL     U-PHENCYCLIDINE NEGATIVE     Comment: Cutoff = 25 ng/mL  Drug screening is for medical purposes only.  A confirmation by GC/MS may be ordered to verify clinically questionable false positive results.     Basic Metabolic Panel   Result Value    Sodium 135    Potassium 4.0    Chloride 99    Carbon Dioxide 23    Anion Gap 17    Glucose 161 (H)    BUN 8    Creatinine 0.71    GFR Estimate,  >90     Comment: eGFR results = or >90 mL/min/1.73m2 = Normal kidney function.    GFR Estimate, Non  >90     Comment: eGFR results = or >90 mL/min/1.73m2 = Normal kidney function.    BUN/Creatinine Ratio 11    CALCIUM 8.5   CBC & Auto Differential   Result Value    WBC 9.2    RBC 3.26 (L)    HGB 11.3 (L)    HCT 34.3 (L)    .2 (H)    MCH 34.7 (H)    MCHC 32.9    RDW-CV 12.7        NRBC 0    DIFF TYPE AUTOMATED DIFFERENTIAL    Neutrophil 86    LYMPH 7    MONO 6    EOSIN 0    BASO 0    Percent Immature Granuloctyes 1    Absolute Neutrophil 7.9 (H)    Absolute Lymph 0.7 (L)    Absolute Mono 0.6    Absolute Eos 0.0 (L)    Absolute Baso 0.0    Absolute Immature Granulocytes 0.1   Prothrombin Time   Result Value    PROTIME 10.9    INR 1.0     Comment: INR Therapeutic Range: 2.0 to 3.0 (2.5 to 3.5 recommended for recurrent thrombotic episodes and mechanical prosthetic heart valves.)    Basic Metabolic Panel   Result Value    Sodium 138    Potassium 3.9    Chloride 103    Carbon Dioxide 28    Anion Gap 11    Glucose 98    BUN 6    Creatinine 0.55    GFR Estimate,  >90     Comment: eGFR results = or >90 mL/min/1.73m2 = Normal kidney function.    GFR Estimate, Non  >90     Comment: eGFR results = or >90 mL/min/1.73m2 = Normal kidney function.    BUN/Creatinine Ratio 11    CALCIUM 8.3 (L)   CBC & Auto Differential   Result Value    WBC 7.8    RBC 2.73 (L)    HGB 9.4 (L)    HCT 28.1 (L)    .9 (H)    MCH 34.4 (H)    MCHC 33.5    RDW-CV 12.9     (L)    NRBC 0     DIFF TYPE AUTOMATED DIFFERENTIAL    Neutrophil 66    LYMPH 23    MONO 8    EOSIN 1    BASO 1    Percent Immature Granuloctyes 1    Absolute Neutrophil 5.2    Absolute Lymph 1.8    Absolute Mono 0.6    Absolute Eos 0.1    Absolute Baso 0.0    Absolute Immature Granulocytes 0.1   Electrocardiogram 12-Lead   Result Value    Ventricular Rate EKG/Min (BPM) 107    Atrial Rate (BPM) 107    LA-Interval (MSEC) 140    QRS-Interval (MSEC) 72    QT-Interval (MSEC) 340    QTc 453    P Axis (Degrees) 65    R Axis (Degrees) 26    T Axis (Degrees) 74    REPORT TEXT      Sinus tachycardia  Nonspecific ST and T wave abnormality  Abnormal ECG  When compared with ECG of  27-JAN-2019 10:12,  Inverted T waves have replaced nonspecific T wave abnormality in  Anterior leads  Confirmed by CORINNA MULLEN, SADIE ([id number]) on 1/28/2019 2:03:59 PM     Echo M-mode/2D/Doppler (Routine)   Result Value    Left Ventricular Internal Dimension in Diastole 3.6    LVOT 2D 1.9    Left Internal Dimenson in Systole 2.2    Ejection Fraction 65.0    Interventricular Septum in End Diastole 0.9    Left ventricle end diastolic posterior wall thickness (cm) 1.0    Ascending Aorta 3.3    AV Peak Velocity 1.6    AV Peak Gradient 10.6    AV Mean Gradient 5.1    Aortic Valve Area 2.1    DOP Calc LVOT Peak Bk 1.2    RV Tissue Doppler Free Wall Heart Rate 0.2    LVOT VTI 19.6         TIME TAKEN FOR DISCHARGE:  greater than 30 minutes    Patient care performed in collaboration with Dr Ian Garcia.    Exam neurovascular exam in tact, no syncopal episodes     Medical decision making plan for discharge with holter monitor.     Ambulatory

## 2023-10-18 NOTE — ASU DISCHARGE PLAN (ADULT/PEDIATRIC) - NS MD DC FALL RISK RISK
For information on Fall & Injury Prevention, visit: [email or web address] OR  [email or web address] OR  https://www.cdc.gov/steadi/patient.html

## 2023-10-18 NOTE — PROCEDURE NOTE - ADDITIONAL PROCEDURE DETAILS
After written, informed consent was obtained, the patient was brought to the operating room and positioned himself supine on the OR table.  Monitors were applied and sedation induced by the anesthesia team.  Time out was completed and the right knee site was prepped and draped in the usual sterile fashion.    Using fluoroscopic imaging, the sites of the right superomedial, superolateral, and inferomedial genicular nerves were identified.  Each site was anesthetized with several cc of 1% lidocaine.  With the aid of fluoroscopic guidance, a 22 gauge 3.5 inch needle was advanced to the periosteum at the metaphysis at each of the above locations.  After negative aspiration, a total of 2 cc of 0.5% bupivacaine was instilled at each of the three sites.  The needle was removed and the sites cleaned and dressed with a Band-Aid.    The patient tolerated the procedure well and was transported to the recovery room in stable condition.

## 2023-10-18 NOTE — ASU PREOP CHECKLIST - BMI (KG/M2)
Implemented All Universal Safety Interventions:  [place] to call system. Call bell, personal items and telephone within reach. Instruct patient to call for assistance. Room bathroom lighting operational. Non-slip footwear when patient is off stretcher. Physically safe environment: no spills, clutter or unnecessary equipment. Stretcher in lowest position, wheels locked, appropriate side rails in place.
31.3

## 2023-11-08 ENCOUNTER — APPOINTMENT (OUTPATIENT)
Dept: PAIN MANAGEMENT | Facility: CLINIC | Age: 68
End: 2023-11-08
Payer: COMMERCIAL

## 2023-11-08 VITALS
RESPIRATION RATE: 16 BRPM | WEIGHT: 216 LBS | OXYGEN SATURATION: 98 % | HEIGHT: 72 IN | DIASTOLIC BLOOD PRESSURE: 87 MMHG | SYSTOLIC BLOOD PRESSURE: 150 MMHG | HEART RATE: 77 BPM | TEMPERATURE: 97.8 F | BODY MASS INDEX: 29.26 KG/M2

## 2023-11-08 PROCEDURE — 99213 OFFICE O/P EST LOW 20 MIN: CPT

## 2023-11-13 ENCOUNTER — OUTPATIENT (OUTPATIENT)
Dept: OUTPATIENT SERVICES | Facility: HOSPITAL | Age: 68
LOS: 1 days | End: 2023-11-13

## 2023-11-13 VITALS
WEIGHT: 214.95 LBS | SYSTOLIC BLOOD PRESSURE: 151 MMHG | TEMPERATURE: 98 F | HEART RATE: 66 BPM | RESPIRATION RATE: 17 BRPM | HEIGHT: 71 IN | OXYGEN SATURATION: 98 % | DIASTOLIC BLOOD PRESSURE: 82 MMHG

## 2023-11-13 DIAGNOSIS — T78.40XS ALLERGY, UNSPECIFIED, SEQUELA: ICD-10-CM

## 2023-11-13 DIAGNOSIS — Z96.652 PRESENCE OF LEFT ARTIFICIAL KNEE JOINT: Chronic | ICD-10-CM

## 2023-11-13 DIAGNOSIS — Z96.611 PRESENCE OF RIGHT ARTIFICIAL SHOULDER JOINT: Chronic | ICD-10-CM

## 2023-11-13 DIAGNOSIS — Z96.651 PRESENCE OF RIGHT ARTIFICIAL KNEE JOINT: Chronic | ICD-10-CM

## 2023-11-13 DIAGNOSIS — M25.561 PAIN IN RIGHT KNEE: ICD-10-CM

## 2023-11-13 DIAGNOSIS — K21.9 GASTRO-ESOPHAGEAL REFLUX DISEASE WITHOUT ESOPHAGITIS: ICD-10-CM

## 2023-11-13 DIAGNOSIS — G47.33 OBSTRUCTIVE SLEEP APNEA (ADULT) (PEDIATRIC): ICD-10-CM

## 2023-11-13 DIAGNOSIS — Z98.890 OTHER SPECIFIED POSTPROCEDURAL STATES: Chronic | ICD-10-CM

## 2023-11-13 DIAGNOSIS — I10 ESSENTIAL (PRIMARY) HYPERTENSION: ICD-10-CM

## 2023-11-13 DIAGNOSIS — S82.209A UNSPECIFIED FRACTURE OF SHAFT OF UNSPECIFIED TIBIA, INITIAL ENCOUNTER FOR CLOSED FRACTURE: Chronic | ICD-10-CM

## 2023-11-13 DIAGNOSIS — M17.0 BILATERAL PRIMARY OSTEOARTHRITIS OF KNEE: ICD-10-CM

## 2023-11-13 LAB
ANION GAP SERPL CALC-SCNC: 11 MMOL/L — SIGNIFICANT CHANGE UP (ref 7–14)
ANION GAP SERPL CALC-SCNC: 11 MMOL/L — SIGNIFICANT CHANGE UP (ref 7–14)
BUN SERPL-MCNC: 26 MG/DL — HIGH (ref 7–23)
BUN SERPL-MCNC: 26 MG/DL — HIGH (ref 7–23)
CALCIUM SERPL-MCNC: 9.5 MG/DL — SIGNIFICANT CHANGE UP (ref 8.4–10.5)
CALCIUM SERPL-MCNC: 9.5 MG/DL — SIGNIFICANT CHANGE UP (ref 8.4–10.5)
CHLORIDE SERPL-SCNC: 105 MMOL/L — SIGNIFICANT CHANGE UP (ref 98–107)
CHLORIDE SERPL-SCNC: 105 MMOL/L — SIGNIFICANT CHANGE UP (ref 98–107)
CO2 SERPL-SCNC: 25 MMOL/L — SIGNIFICANT CHANGE UP (ref 22–31)
CO2 SERPL-SCNC: 25 MMOL/L — SIGNIFICANT CHANGE UP (ref 22–31)
CREAT SERPL-MCNC: 1.08 MG/DL — SIGNIFICANT CHANGE UP (ref 0.5–1.3)
CREAT SERPL-MCNC: 1.08 MG/DL — SIGNIFICANT CHANGE UP (ref 0.5–1.3)
EGFR: 75 ML/MIN/1.73M2 — SIGNIFICANT CHANGE UP
EGFR: 75 ML/MIN/1.73M2 — SIGNIFICANT CHANGE UP
GLUCOSE SERPL-MCNC: 113 MG/DL — HIGH (ref 70–99)
GLUCOSE SERPL-MCNC: 113 MG/DL — HIGH (ref 70–99)
HCT VFR BLD CALC: 43.1 % — SIGNIFICANT CHANGE UP (ref 39–50)
HCT VFR BLD CALC: 43.1 % — SIGNIFICANT CHANGE UP (ref 39–50)
HGB BLD-MCNC: 14.4 G/DL — SIGNIFICANT CHANGE UP (ref 13–17)
HGB BLD-MCNC: 14.4 G/DL — SIGNIFICANT CHANGE UP (ref 13–17)
MCHC RBC-ENTMCNC: 30.4 PG — SIGNIFICANT CHANGE UP (ref 27–34)
MCHC RBC-ENTMCNC: 30.4 PG — SIGNIFICANT CHANGE UP (ref 27–34)
MCHC RBC-ENTMCNC: 33.4 GM/DL — SIGNIFICANT CHANGE UP (ref 32–36)
MCHC RBC-ENTMCNC: 33.4 GM/DL — SIGNIFICANT CHANGE UP (ref 32–36)
MCV RBC AUTO: 90.9 FL — SIGNIFICANT CHANGE UP (ref 80–100)
MCV RBC AUTO: 90.9 FL — SIGNIFICANT CHANGE UP (ref 80–100)
NRBC # BLD: 0 /100 WBCS — SIGNIFICANT CHANGE UP (ref 0–0)
NRBC # BLD: 0 /100 WBCS — SIGNIFICANT CHANGE UP (ref 0–0)
NRBC # FLD: 0 K/UL — SIGNIFICANT CHANGE UP (ref 0–0)
NRBC # FLD: 0 K/UL — SIGNIFICANT CHANGE UP (ref 0–0)
PLATELET # BLD AUTO: 297 K/UL — SIGNIFICANT CHANGE UP (ref 150–400)
PLATELET # BLD AUTO: 297 K/UL — SIGNIFICANT CHANGE UP (ref 150–400)
POTASSIUM SERPL-MCNC: 4.7 MMOL/L — SIGNIFICANT CHANGE UP (ref 3.5–5.3)
POTASSIUM SERPL-MCNC: 4.7 MMOL/L — SIGNIFICANT CHANGE UP (ref 3.5–5.3)
POTASSIUM SERPL-SCNC: 4.7 MMOL/L — SIGNIFICANT CHANGE UP (ref 3.5–5.3)
POTASSIUM SERPL-SCNC: 4.7 MMOL/L — SIGNIFICANT CHANGE UP (ref 3.5–5.3)
RBC # BLD: 4.74 M/UL — SIGNIFICANT CHANGE UP (ref 4.2–5.8)
RBC # BLD: 4.74 M/UL — SIGNIFICANT CHANGE UP (ref 4.2–5.8)
RBC # FLD: 13.2 % — SIGNIFICANT CHANGE UP (ref 10.3–14.5)
RBC # FLD: 13.2 % — SIGNIFICANT CHANGE UP (ref 10.3–14.5)
SODIUM SERPL-SCNC: 141 MMOL/L — SIGNIFICANT CHANGE UP (ref 135–145)
SODIUM SERPL-SCNC: 141 MMOL/L — SIGNIFICANT CHANGE UP (ref 135–145)
WBC # BLD: 8.01 K/UL — SIGNIFICANT CHANGE UP (ref 3.8–10.5)
WBC # BLD: 8.01 K/UL — SIGNIFICANT CHANGE UP (ref 3.8–10.5)
WBC # FLD AUTO: 8.01 K/UL — SIGNIFICANT CHANGE UP (ref 3.8–10.5)
WBC # FLD AUTO: 8.01 K/UL — SIGNIFICANT CHANGE UP (ref 3.8–10.5)

## 2023-11-13 RX ORDER — METOPROLOL TARTRATE 50 MG
1 TABLET ORAL
Refills: 0 | DISCHARGE

## 2023-11-13 RX ORDER — OXYCODONE HYDROCHLORIDE 5 MG/1
1 TABLET ORAL
Refills: 0 | DISCHARGE

## 2023-11-13 RX ORDER — LOSARTAN POTASSIUM 100 MG/1
1 TABLET, FILM COATED ORAL
Refills: 0 | DISCHARGE

## 2023-11-13 RX ORDER — AMLODIPINE BESYLATE 2.5 MG/1
1 TABLET ORAL
Refills: 0 | DISCHARGE

## 2023-11-13 RX ORDER — OMEPRAZOLE 10 MG/1
1 CAPSULE, DELAYED RELEASE ORAL
Refills: 0 | DISCHARGE

## 2023-11-13 RX ORDER — MELOXICAM 15 MG/1
1 TABLET ORAL
Refills: 0 | DISCHARGE

## 2023-11-13 NOTE — H&P PST ADULT - FUNCTIONAL STATUS
walks 1 mile daily , stairs 6-8 flights daily , ADL's ; METS 6 , denies any cardiac complaints/4-10 METS

## 2023-11-13 NOTE — H&P PST ADULT - PROBLEM SELECTOR PLAN 1
effusion right knee   pain in the right knee   Scheduled for genicular nerve block   Preop instructions provided and patient verbalizes understanding.  Labs done and results pending.  One day pre op , bloods sent stat , discussed with anesthesia

## 2023-11-13 NOTE — H&P PST ADULT - ATTENDING COMMENTS
The H&P has been updated by me today (12/6/23) to reflect the appropriate procedure.    Juan Luis Rader MD, MA

## 2023-11-13 NOTE — H&P PST ADULT - NSICDXPASTMEDICALHX_GEN_ALL_CORE_FT
PAST MEDICAL HISTORY:  Aftercare following joint replacement surgery     Anxiety     Fracture left leg fractured tibia/fibula (2011)    GERD (gastroesophageal reflux disease)     HTN (hypertension)     Pain in left knee     Pain in right knee     Presence of left artificial knee joint

## 2023-11-13 NOTE — H&P PST ADULT - PROBLEM SELECTOR PLAN 2
Pt instructed totake losartan and amlodipine day of surgery . Monitor BP during hospital stay . Pt instructed to take losartan and amlodipine day of surgery . Monitor BP during hospital stay .

## 2023-11-13 NOTE — H&P PST ADULT - HISTORY OF PRESENT ILLNESS
This is a 68 y.o. male with  This is a 68 y.o. male with bilateral primary osteoarthritis of knee , effusion right knee, pain in right knee . Pt had x-rays done, MRI done . Pt with multiple  right knee replacements , last 2019 . Pt is s/p trial of nerve block on right knee 9/23 . Pt is scheduled for surgery 11/14/23 . Pt offers no complaints in pst .  This is a 68 y.o. male with bilateral primary osteoarthritis of knee , effusion right knee, pain in right knee . Pt had x-rays done, MRI done . Pt with multiple  rquight knee replacements , last 2019 . Pt is s/p trial of nerve block on right knee 9/23 . Pt is scheduled for surgery 11/14/23 . Pt offers no complaints in pst .

## 2023-11-14 ENCOUNTER — TRANSCRIPTION ENCOUNTER (OUTPATIENT)
Age: 68
End: 2023-11-14

## 2023-11-14 ENCOUNTER — OUTPATIENT (OUTPATIENT)
Dept: OUTPATIENT SERVICES | Facility: HOSPITAL | Age: 68
LOS: 1 days | Discharge: HOME | End: 2023-11-14
Payer: COMMERCIAL

## 2023-11-14 ENCOUNTER — APPOINTMENT (OUTPATIENT)
Dept: PAIN MANAGEMENT | Facility: AMBULATORY SURGERY CENTER | Age: 68
End: 2023-11-14

## 2023-11-14 VITALS
TEMPERATURE: 98 F | SYSTOLIC BLOOD PRESSURE: 143 MMHG | HEART RATE: 68 BPM | DIASTOLIC BLOOD PRESSURE: 84 MMHG | RESPIRATION RATE: 17 BRPM | OXYGEN SATURATION: 95 %

## 2023-11-14 VITALS
TEMPERATURE: 98 F | DIASTOLIC BLOOD PRESSURE: 68 MMHG | OXYGEN SATURATION: 100 % | RESPIRATION RATE: 13 BRPM | HEART RATE: 68 BPM | SYSTOLIC BLOOD PRESSURE: 120 MMHG

## 2023-11-14 DIAGNOSIS — Z96.652 PRESENCE OF LEFT ARTIFICIAL KNEE JOINT: Chronic | ICD-10-CM

## 2023-11-14 DIAGNOSIS — M17.0 BILATERAL PRIMARY OSTEOARTHRITIS OF KNEE: ICD-10-CM

## 2023-11-14 DIAGNOSIS — Z96.651 PRESENCE OF RIGHT ARTIFICIAL KNEE JOINT: Chronic | ICD-10-CM

## 2023-11-14 DIAGNOSIS — Z98.890 OTHER SPECIFIED POSTPROCEDURAL STATES: Chronic | ICD-10-CM

## 2023-11-14 DIAGNOSIS — M25.562 PAIN IN LEFT KNEE: ICD-10-CM

## 2023-11-14 DIAGNOSIS — S82.209A UNSPECIFIED FRACTURE OF SHAFT OF UNSPECIFIED TIBIA, INITIAL ENCOUNTER FOR CLOSED FRACTURE: Chronic | ICD-10-CM

## 2023-11-14 DIAGNOSIS — Z96.611 PRESENCE OF RIGHT ARTIFICIAL SHOULDER JOINT: Chronic | ICD-10-CM

## 2023-11-14 PROCEDURE — 64454 NJX AA&/STRD GNCLR NRV BRNCH: CPT

## 2023-11-14 NOTE — ASU PREOP CHECKLIST - LAST TOOK
Incoming fax from pharmacy stating that they need a diagnosis code for medicaid to cover the Gabapentin now. Please advise.    0500/clears

## 2023-11-14 NOTE — ASU PREOP CHECKLIST - INTERNAL PROSTHESES
b/l TKR Right shaft, left leg hardware/yes(specify) b/l TKR Right shoulder, left leg hardware/yes(specify)

## 2023-11-14 NOTE — PROCEDURE NOTE - ADDITIONAL PROCEDURE DETAILS
After written, informed consent was obtained, the patient was brought to the OR where he positioned himself supine on the OR table with his left knee supported by a bolster.  Monitors were applied and sedation begun by the anesthesia team.  The site was prepped and draped in the usual sterile fashion and time out was completed.    With the aid of fluoroscopy, the left superomedial, left superolateral, and left inferomedial genicular nerve locations were identified.  Each of the sites was anesthetized superficially with several cc of 1% lidocaine.  At each site, a 3.5 inch 22 gauge needle was advanced to the level of the bone at its border.  After negative aspiration, 2 cc of 0.5% bupivacaine was instilled at each site.  The needle was removed and hemostasis confirmed.    The field was cleaned and dried and a Band-Aid dressing applied at each site.   The patient tolerated the procedure well and was transported to the PACU in stable condition.  He will follow up with the results of his diagnostic procedure.

## 2023-11-14 NOTE — ASU PREOPERATIVE ASSESSMENT, ADULT ([facility] ONLY) - FALL HARM RISK - UNIVERSAL INTERVENTIONS
Bed in lowest position, wheels locked, appropriate side rails in place/Call bell, personal items and telephone in reach/Instruct patient to call for assistance before getting out of bed or chair/Non-slip footwear when patient is out of bed/[place] to call system/Physically safe environment - no spills, clutter or unnecessary equipment/Purposeful Proactive Rounding/Room/bathroom lighting operational, light cord in reach

## 2023-11-14 NOTE — ASU DISCHARGE PLAN (ADULT/PEDIATRIC) - CALL YOUR DOCTOR IF YOU HAVE ANY OF THE FOLLOWING:
Bleeding that does not stop/Swelling that gets worse/Wound/Surgical Site with redness, or foul smelling discharge or pus/Numbness, tingling, color or temperature change to extremity/Nausea and vomiting that does not stop/Inability to tolerate liquids or foods Bleeding that does not stop/Swelling that gets worse/Fever greater than (need to indicate Fahrenheit or Celsius)/Wound/Surgical Site with redness, or foul smelling discharge or pus/Numbness, tingling, color or temperature change to extremity/Nausea and vomiting that does not stop/Inability to tolerate liquids or foods

## 2023-11-14 NOTE — ASU DISCHARGE PLAN (ADULT/PEDIATRIC) - CARE PROVIDER_API CALL
Juan Luis Rader.  Pain Medicine  [address] [place] [address], [facility] [address]  [place] [address]  Phone: [phone]  Fax: [phone]  Follow Up Time:

## 2023-11-15 PROBLEM — K21.9 GASTRO-ESOPHAGEAL REFLUX DISEASE WITHOUT ESOPHAGITIS: Chronic | Status: ACTIVE | Noted: 2023-11-13

## 2023-11-28 ENCOUNTER — APPOINTMENT (OUTPATIENT)
Dept: PAIN MANAGEMENT | Facility: CLINIC | Age: 68
End: 2023-11-28
Payer: COMMERCIAL

## 2023-11-28 VITALS
TEMPERATURE: 97.5 F | HEART RATE: 85 BPM | DIASTOLIC BLOOD PRESSURE: 83 MMHG | OXYGEN SATURATION: 96 % | HEIGHT: 72 IN | SYSTOLIC BLOOD PRESSURE: 169 MMHG | RESPIRATION RATE: 16 BRPM | BODY MASS INDEX: 29.26 KG/M2 | WEIGHT: 216 LBS

## 2023-11-28 DIAGNOSIS — M25.561 PAIN IN RIGHT KNEE: ICD-10-CM

## 2023-11-28 DIAGNOSIS — M25.562 PAIN IN LEFT KNEE: ICD-10-CM

## 2023-11-28 DIAGNOSIS — M17.0 BILATERAL PRIMARY OSTEOARTHRITIS OF KNEE: ICD-10-CM

## 2023-11-28 DIAGNOSIS — M25.562 PAIN IN RIGHT KNEE: ICD-10-CM

## 2023-11-28 DIAGNOSIS — T84.032A MECHANICAL LOOSENING OF INTERNAL RIGHT KNEE PROSTHETIC JOINT, INITIAL ENCOUNTER: ICD-10-CM

## 2023-11-28 PROCEDURE — 99213 OFFICE O/P EST LOW 20 MIN: CPT

## 2023-12-05 VITALS
HEART RATE: 66 BPM | TEMPERATURE: 97 F | SYSTOLIC BLOOD PRESSURE: 151 MMHG | RESPIRATION RATE: 16 BRPM | WEIGHT: 214.95 LBS | DIASTOLIC BLOOD PRESSURE: 87 MMHG | OXYGEN SATURATION: 98 % | HEIGHT: 71 IN

## 2023-12-05 NOTE — ASU PREOPERATIVE ASSESSMENT, ADULT (IPARK ONLY) - FALL HARM RISK - UNIVERSAL INTERVENTIONS
Bed in lowest position, wheels locked, appropriate side rails in place/Call bell, personal items and telephone in reach/Instruct patient to call for assistance before getting out of bed or chair/Non-slip footwear when patient is out of bed/[place] to call system/Physically safe environment - no spills, clutter or unnecessary equipment/Purposeful Proactive Rounding/Room/bathroom lighting operational, light cord in reach Bed in lowest position, wheels locked, appropriate side rails in place/Call bell, personal items and telephone in reach/Instruct patient to call for assistance before getting out of bed or chair/Non-slip footwear when patient is out of bed/[place] to call system/Physically safe environment - no spills, clutter or unnecessary equipment/Purposeful Proactive Rounding/Room/bathroom lighting operational, light cord in reach

## 2023-12-06 ENCOUNTER — TRANSCRIPTION ENCOUNTER (OUTPATIENT)
Age: 68
End: 2023-12-06

## 2023-12-06 ENCOUNTER — OUTPATIENT (OUTPATIENT)
Dept: OUTPATIENT SERVICES | Facility: HOSPITAL | Age: 68
LOS: 1 days | Discharge: HOME | End: 2023-12-06
Payer: COMMERCIAL

## 2023-12-06 ENCOUNTER — APPOINTMENT (OUTPATIENT)
Dept: PAIN MANAGEMENT | Facility: AMBULATORY SURGERY CENTER | Age: 68
End: 2023-12-06

## 2023-12-06 VITALS
SYSTOLIC BLOOD PRESSURE: 121 MMHG | OXYGEN SATURATION: 100 % | HEART RATE: 60 BPM | DIASTOLIC BLOOD PRESSURE: 65 MMHG | RESPIRATION RATE: 15 BRPM | TEMPERATURE: 98 F

## 2023-12-06 DIAGNOSIS — Z96.651 PRESENCE OF RIGHT ARTIFICIAL KNEE JOINT: Chronic | ICD-10-CM

## 2023-12-06 DIAGNOSIS — Z96.611 PRESENCE OF RIGHT ARTIFICIAL SHOULDER JOINT: Chronic | ICD-10-CM

## 2023-12-06 DIAGNOSIS — Z96.652 PRESENCE OF LEFT ARTIFICIAL KNEE JOINT: Chronic | ICD-10-CM

## 2023-12-06 DIAGNOSIS — S82.209A UNSPECIFIED FRACTURE OF SHAFT OF UNSPECIFIED TIBIA, INITIAL ENCOUNTER FOR CLOSED FRACTURE: Chronic | ICD-10-CM

## 2023-12-06 DIAGNOSIS — Z98.890 OTHER SPECIFIED POSTPROCEDURAL STATES: Chronic | ICD-10-CM

## 2023-12-06 DIAGNOSIS — M25.561 PAIN IN RIGHT KNEE: ICD-10-CM

## 2023-12-06 DIAGNOSIS — M25.461 EFFUSION, RIGHT KNEE: ICD-10-CM

## 2023-12-06 PROCEDURE — 64640 INJECTION TREATMENT OF NERVE: CPT

## 2023-12-06 PROCEDURE — 64454 NJX AA&/STRD GNCLR NRV BRNCH: CPT | Mod: 59

## 2023-12-06 NOTE — ASU DISCHARGE PLAN (ADULT/PEDIATRIC) - CARE PROVIDER_API CALL
Juan Luis Rader.  Pain Medicine  [address] [place] [address], [facility] [address]  [place] [address]  Phone: [phone]  Fax: [phone]  Follow Up Time:    Juan Luis Rader.  Pain Medicine  [address] [place] [address], [facility] [address]  [place] [address]  Phone: [phone]  Fax: [phone]  Follow Up Time:

## 2023-12-06 NOTE — PROCEDURE NOTE - NSICDXPROCEDURE_GEN_ALL_CORE_FT
PROCEDURES:  Radiofrequency ablation of genicular nerves of knee with fluoroscopic guidance 06-Dec-2023 13:51:34  Juan Luis Rader  Block, nerve, genicular, right 06-Dec-2023 13:51:45  Juan Luis Rader

## 2023-12-06 NOTE — PROCEDURE NOTE - ADDITIONAL PROCEDURE DETAILS
After written, informed consent was obtained, the patient was brought to the operating room where he positioned himself supine on the OR table.  A pillow bolster was placed under the right knee to optimize exposure, and sedation was induced by the Anesthesia team.  The site was prepped and draped in sterile fashion and time out was completed.    Using fluoroscopy, the locations of the right superomedial, superolateral, and inferomedial genicular nerves were identified.  Local anesthesia was provided with 1% lidocaine at each site.  At each site, a 10 cm 20 gauge RF needle was placed adjacent to the bone and confirmed in lateral projection to be at the mid-shaft level.  AP appropriate position was confirmed with imaging, and 1 cc of 1% lidocaine was given at each site.  Radiofrequency lesioning was then carried out at 60 degrees Celsius for 90 seconds at each site.  A 6 cc mixture of 2% lidocaine and 40 mg of triamcinolone was divided equally between each RF needle, and each needle had its stylet replaced afterward.    The needles and probes were removed and the sites were cleaned and dressed without incident.  The patient tolerated the procedure well and was transported to the PACU in stable condition.  He will follow up for the same procedure on the left side next week.

## 2023-12-06 NOTE — PROCEDURE NOTE - NSICDXPROBLEMMLMBOX_GEN
IPSTART~^~1~^~[id number]~^~~^~IPEND  PKSTART~^~[id number]~^~PKEND   IPSTART~^~1~^~[id number]~^~~^~IPEND  PKSTART~^~[id number]~^~PKEND

## 2023-12-06 NOTE — ASU DISCHARGE PLAN (ADULT/PEDIATRIC) - NS MD DC FALL RISK RISK
- stable  - metoprolol 2.5mg q6 IV For information on Fall & Injury Prevention, visit: [email or web address]-[email or web address]-mobility OR  [email or web address]-injury OR  https://www.cdc.gov/steadi/patient.html For information on Fall & Injury Prevention, visit: [email or web address]://www.[facility]/news/fall-prevention-protects-and-maintains-health-and-mobility OR  https://www.[facility]/news/fall-prevention-tips-to-avoid-injury OR  https://www.cdc.gov/steadi/patient.html - continue NS

## 2023-12-06 NOTE — ASU PREOP CHECKLIST - ISOLATION PRECAUTIONS
My note supersedes the resident's note in the event of a discrepancy-     Patient seen and examined this morning  lying in bed  awake, uncooperative and agitated   not following commands    Vital Signs Last 24 Hrs  T(C): 35.3 (19 Jan 2022 11:12), Max: 36.9 (18 Jan 2022 18:00)  T(F): 95.6 (19 Jan 2022 11:12), Max: 98.4 (18 Jan 2022 18:00)  HR: 83 (19 Jan 2022 11:12) (60 - 115)  BP: 109/73 (19 Jan 2022 11:12) (109/73 - 145/88)  BP(mean): 86 (19 Jan 2022 11:12) (81 - 100)  RR: 18 (19 Jan 2022 11:12) (18 - 20)  SpO2: 100% (19 Jan 2022 11:12) (98% - 100%)    #Breakthrough seizures  -loaded with vimpat  -placed on VEEG  -seizure precautions  -Neuro follow up  -PRN ativan     #Acute Hypoxic resp failure 2/2 COVID-19 pneumonia   -IV dexamethasone   -check inflammatory markers q48  -ID consult  -O2 supplementation   -isolation precautions     #HTN/DL  -stable on current tx    #H/o CVA w/ L tristen  #Dementia   -frequent orientation  -hold sedating meds  -fall precautions     #Suspected Vitamin B12 and Folate deficiencies   -supplement    Progress Note Handoff  Pending Consults: neuro and ID follow up  Pending Tests: labs  Pending Results: veeg, labs  Family Discussion: discussed medication, veeg and overall plan of care with medical staff.  Family updated by medical staff   Disposition: Home_____/SNF______/Other_____/Unknown at this time_x____ none

## 2023-12-06 NOTE — ASU DISCHARGE PLAN (ADULT/PEDIATRIC) - FOLLOW UP APPOINTMENTS
[facility] Medicine ([facility]) [facility] Medicine ([facility])

## 2023-12-06 NOTE — ASU DISCHARGE PLAN (ADULT/PEDIATRIC) - CALL YOUR DOCTOR IF YOU HAVE ANY OF THE FOLLOWING:
Bleeding that does not stop/Wound/Surgical Site with redness, or foul smelling discharge or pus/Numbness, tingling, color or temperature change to extremity Bleeding that does not stop/Pain not relieved by Medications/Fever greater than (need to indicate Fahrenheit or Celsius)/Wound/Surgical Site with redness, or foul smelling discharge or pus/Numbness, tingling, color or temperature change to extremity/Nausea and vomiting that does not stop

## 2023-12-12 ENCOUNTER — OUTPATIENT (OUTPATIENT)
Dept: OUTPATIENT SERVICES | Facility: HOSPITAL | Age: 68
LOS: 1 days | End: 2023-12-12

## 2023-12-12 VITALS
HEART RATE: 70 BPM | RESPIRATION RATE: 16 BRPM | TEMPERATURE: 98 F | OXYGEN SATURATION: 97 % | SYSTOLIC BLOOD PRESSURE: 160 MMHG | DIASTOLIC BLOOD PRESSURE: 88 MMHG | WEIGHT: 210.98 LBS | HEIGHT: 71 IN

## 2023-12-12 DIAGNOSIS — M25.562 PAIN IN LEFT KNEE: ICD-10-CM

## 2023-12-12 DIAGNOSIS — S82.209A UNSPECIFIED FRACTURE OF SHAFT OF UNSPECIFIED TIBIA, INITIAL ENCOUNTER FOR CLOSED FRACTURE: Chronic | ICD-10-CM

## 2023-12-12 DIAGNOSIS — Z96.652 PRESENCE OF LEFT ARTIFICIAL KNEE JOINT: Chronic | ICD-10-CM

## 2023-12-12 DIAGNOSIS — Z96.611 PRESENCE OF RIGHT ARTIFICIAL SHOULDER JOINT: Chronic | ICD-10-CM

## 2023-12-12 DIAGNOSIS — K21.9 GASTRO-ESOPHAGEAL REFLUX DISEASE WITHOUT ESOPHAGITIS: ICD-10-CM

## 2023-12-12 DIAGNOSIS — I10 ESSENTIAL (PRIMARY) HYPERTENSION: ICD-10-CM

## 2023-12-12 DIAGNOSIS — Z98.890 OTHER SPECIFIED POSTPROCEDURAL STATES: Chronic | ICD-10-CM

## 2023-12-12 DIAGNOSIS — Z96.651 PRESENCE OF RIGHT ARTIFICIAL KNEE JOINT: Chronic | ICD-10-CM

## 2023-12-12 NOTE — H&P PST ADULT - PROBLEM SELECTOR PLAN 3
Pt instructed to take Prilosec AM of surgery with a sip of water, pt able to verbalize understanding.

## 2023-12-12 NOTE — H&P PST ADULT - HISTORY OF PRESENT ILLNESS
This is a 68 y.o. male with bilateral primary osteoarthritis of knee , s/p multiple knee replacement surgeries, continue to have pain.  Pt s/p right genicular nerve radiofrequency procedure 11/2023 with good relief of pain.  Pt is scheduled for left genicular nerve radiofrequency treatment.

## 2023-12-12 NOTE — H&P PST ADULT - PROBLEM SELECTOR PLAN 1
Pt is scheduled for left genicular nerve radiofrequency treatment on 12/14/23.  Verbal and written pre op instructions reviewed with patient and pt able to verbalize understanding.   Verbal and written instructions given with chlorhexidine wash, pt able to verbalize understanding via teach back method.  CBC BMP from 11/2023 in chart.  EKG from 9/2023 in chart.

## 2023-12-12 NOTE — H&P PST ADULT - NSICDXPASTMEDICALHX_GEN_ALL_CORE_FT
PAST MEDICAL HISTORY:  Aftercare following joint replacement surgery     Anxiety     Fracture left leg fractured tibia/fibula (2011)    GERD (gastroesophageal reflux disease)     HTN (hypertension)     Osteoarthritis     Pain in left knee     Pain in right knee     Presence of left artificial knee joint

## 2023-12-12 NOTE — H&P PST ADULT - ENMT COMMENTS
Denies loose teeth or dentures.  Mallampati denies denture or loose teeth Denies loose teeth or dentures.  Mallampati III

## 2023-12-12 NOTE — H&P PST ADULT - NEGATIVE NEUROLOGICAL SYMPTOMS
no transient paralysis/no weakness/no paresthesias/no generalized seizures/no focal seizures/no vertigo/no loss of sensation/no difficulty walking

## 2023-12-12 NOTE — H&P PST ADULT - PROBLEM SELECTOR PLAN 2
Pt instructed to take losartan, amlodipine, and metoprolol AM of surgery with a sip of water, pt able to verbalize understanding.  RITA precautions.

## 2023-12-14 ENCOUNTER — TRANSCRIPTION ENCOUNTER (OUTPATIENT)
Age: 68
End: 2023-12-14

## 2023-12-14 ENCOUNTER — APPOINTMENT (OUTPATIENT)
Dept: PAIN MANAGEMENT | Facility: AMBULATORY SURGERY CENTER | Age: 68
End: 2023-12-14

## 2023-12-14 ENCOUNTER — OUTPATIENT (OUTPATIENT)
Dept: OUTPATIENT SERVICES | Facility: HOSPITAL | Age: 68
LOS: 1 days | Discharge: HOME | End: 2023-12-14
Payer: COMMERCIAL

## 2023-12-14 VITALS
OXYGEN SATURATION: 98 % | HEART RATE: 64 BPM | TEMPERATURE: 97 F | DIASTOLIC BLOOD PRESSURE: 94 MMHG | RESPIRATION RATE: 20 BRPM | HEIGHT: 71 IN | WEIGHT: 210.98 LBS | SYSTOLIC BLOOD PRESSURE: 147 MMHG

## 2023-12-14 VITALS
HEART RATE: 63 BPM | RESPIRATION RATE: 14 BRPM | OXYGEN SATURATION: 97 % | SYSTOLIC BLOOD PRESSURE: 133 MMHG | DIASTOLIC BLOOD PRESSURE: 77 MMHG

## 2023-12-14 DIAGNOSIS — Z96.651 PRESENCE OF RIGHT ARTIFICIAL KNEE JOINT: Chronic | ICD-10-CM

## 2023-12-14 DIAGNOSIS — Z96.652 PRESENCE OF LEFT ARTIFICIAL KNEE JOINT: Chronic | ICD-10-CM

## 2023-12-14 DIAGNOSIS — M25.562 PAIN IN LEFT KNEE: ICD-10-CM

## 2023-12-14 DIAGNOSIS — Z98.890 OTHER SPECIFIED POSTPROCEDURAL STATES: Chronic | ICD-10-CM

## 2023-12-14 DIAGNOSIS — Z96.611 PRESENCE OF RIGHT ARTIFICIAL SHOULDER JOINT: Chronic | ICD-10-CM

## 2023-12-14 DIAGNOSIS — S82.209A UNSPECIFIED FRACTURE OF SHAFT OF UNSPECIFIED TIBIA, INITIAL ENCOUNTER FOR CLOSED FRACTURE: Chronic | ICD-10-CM

## 2023-12-14 PROCEDURE — 64624 DSTRJ NULYT AGT GNCLR NRV: CPT

## 2023-12-14 RX ORDER — OMEPRAZOLE 10 MG/1
0 CAPSULE, DELAYED RELEASE ORAL
Refills: 0 | DISCHARGE

## 2023-12-14 RX ORDER — OXYCODONE HYDROCHLORIDE 5 MG/1
1 TABLET ORAL
Refills: 0 | DISCHARGE

## 2023-12-14 RX ORDER — LOSARTAN POTASSIUM 100 MG/1
1 TABLET, FILM COATED ORAL
Refills: 0 | DISCHARGE

## 2023-12-14 RX ORDER — TRIAMCINOLONE ACETONIDE 55 MCG
1 AEROSOL, SPRAY (GRAM) NASAL
Refills: 0 | DISCHARGE

## 2023-12-14 RX ORDER — FEXOFENADINE HCL 30 MG
1 TABLET ORAL
Refills: 0 | DISCHARGE

## 2023-12-14 RX ORDER — MELOXICAM 15 MG/1
1 TABLET ORAL
Refills: 0 | DISCHARGE

## 2023-12-14 RX ORDER — AMLODIPINE BESYLATE 2.5 MG/1
1 TABLET ORAL
Refills: 0 | DISCHARGE

## 2023-12-14 RX ORDER — ACETAMINOPHEN 500 MG
1 TABLET ORAL
Refills: 0 | DISCHARGE

## 2023-12-14 RX ORDER — METOPROLOL TARTRATE 50 MG
1 TABLET ORAL
Refills: 0 | DISCHARGE

## 2023-12-14 NOTE — PROCEDURE NOTE - NSICDXPROCEDURE_GEN_ALL_CORE_FT
PROCEDURES:  Radiofrequency ablation of genicular nerves of knee with fluoroscopic guidance 14-Dec-2023 10:27:39  Juan Luis Rader

## 2023-12-14 NOTE — ASU PREOPERATIVE ASSESSMENT, ADULT (IPARK ONLY) - FALL HARM RISK - UNIVERSAL INTERVENTIONS
Bed in lowest position, wheels locked, appropriate side rails in place/Call bell, personal items and telephone in reach/Instruct patient to call for assistance before getting out of bed or chair/Non-slip footwear when patient is out of bed/[place] to call system/Physically safe environment - no spills, clutter or unnecessary equipment/Purposeful Proactive Rounding/Room/bathroom lighting operational, light cord in reach Bed in lowest position, wheels locked, appropriate side rails in place/Call bell, personal items and telephone in reach/Instruct patient to call for assistance before getting out of bed or chair/Non-slip footwear when patient is out of bed/[place] to call system/Physically safe environment - no spills, clutter or unnecessary equipment/Purposeful Proactive Rounding/Room/bathroom lighting operational, light cord in reach

## 2023-12-14 NOTE — ASU PREOP CHECKLIST - IV STARTED
Problem: Adult Inpatient Plan of Care  Goal: Absence of Hospital-Acquired Illness or Injury  Outcome: Ongoing, Progressing  Intervention: Identify and Manage Fall Risk  Recent Flowsheet Documentation  Taken 10/8/2023 1700 by Oralia Nichols RN  Safety Promotion/Fall Prevention:   activity supervised   assistive device/personal items within reach   clutter free environment maintained   nonskid shoes/slippers when out of bed   safety round/check completed  Taken 10/8/2023 1400 by Oralia Nichols RN  Safety Promotion/Fall Prevention:   activity supervised   assistive device/personal items within reach   clutter free environment maintained   nonskid shoes/slippers when out of bed   safety round/check completed  Taken 10/8/2023 1200 by Oralia Nichols RN  Safety Promotion/Fall Prevention:   activity supervised   assistive device/personal items within reach   clutter free environment maintained   nonskid shoes/slippers when out of bed   safety round/check completed  Taken 10/8/2023 1000 by Oralia Nichols RN  Safety Promotion/Fall Prevention:   activity supervised   assistive device/personal items within reach   clutter free environment maintained   nonskid shoes/slippers when out of bed   safety round/check completed  Taken 10/8/2023 0800 by Oralia Nichols RN  Safety Promotion/Fall Prevention:   activity supervised   assistive device/personal items within reach   clutter free environment maintained   nonskid shoes/slippers when out of bed   safety round/check completed  Intervention: Prevent Skin Injury  Recent Flowsheet Documentation  Taken 10/8/2023 1700 by Oralia Nichols RN  Body Position:   position changed independently   weight shifting  Skin Protection:   tubing/devices free from skin contact   adhesive use limited   drying agents applied   incontinence pads utilized   pectin skin barriers applied   protective footwear used   skin sealant/moisture barrier applied  Taken 10/8/2023 1400 by Oralia Nichols  RN  Body Position:   head facing, left   position changed independently   supine  Skin Protection: tubing/devices free from skin contact  Taken 10/8/2023 1300 by Oralia Nichols RN  Skin Protection: tubing/devices free from skin contact  Taken 10/8/2023 1200 by Oralia Nichols RN  Body Position:   30 degrees   position changed independently   supine   weight shifting  Skin Protection: tubing/devices free from skin contact  Taken 10/8/2023 1000 by Oralia Nichols RN  Body Position:   30 degrees   position changed independently   supine   weight shifting  Taken 10/8/2023 0800 by Oralia Nichols RN  Body Position:   30 degrees   position changed independently   supine   weight shifting  Intervention: Prevent and Manage VTE (Venous Thromboembolism) Risk  Recent Flowsheet Documentation  Taken 10/8/2023 1700 by Oralia Nichols RN  Activity Management: activity encouraged  Taken 10/8/2023 1400 by Oralia Nichols RN  Activity Management: activity encouraged  Taken 10/8/2023 1200 by Oralia Nichols RN  Activity Management: activity minimized  Taken 10/8/2023 1000 by Oralia Nichols RN  Activity Management: activity encouraged  Taken 10/8/2023 0800 by Oralia Nichols RN  Activity Management: activity minimized  Range of Motion: (moving lower extremities)   active ROM (range of motion) encouraged   other (see comments)  Intervention: Prevent Infection  Recent Flowsheet Documentation  Taken 10/8/2023 1200 by Oralia Nichols RN  Infection Prevention:   environmental surveillance performed   equipment surfaces disinfected  Taken 10/8/2023 1000 by Oralia Nichols RN  Infection Prevention:   environmental surveillance performed   equipment surfaces disinfected   hand hygiene promoted  Taken 10/8/2023 0800 by Oralia Nichols RN  Infection Prevention:   environmental surveillance performed   equipment surfaces disinfected   hand hygiene promoted  Goal: Optimal Comfort and Wellbeing  Outcome: Ongoing, Progressing  Intervention:  Provide Person-Centered Care  Recent Flowsheet Documentation  Taken 10/8/2023 1700 by Oralia Nichols RN  Trust Relationship/Rapport:   care explained   choices provided   emotional support provided   empathic listening provided   questions answered   questions encouraged   reassurance provided   thoughts/feelings acknowledged  Taken 10/8/2023 1400 by Oralia Nichols, RN  Trust Relationship/Rapport:   care explained   choices provided   emotional support provided   empathic listening provided   questions answered   questions encouraged   reassurance provided   thoughts/feelings acknowledged  Taken 10/8/2023 1200 by Oralia Nichols, RN  Trust Relationship/Rapport:   care explained   choices provided   emotional support provided   empathic listening provided   questions answered   questions encouraged   reassurance provided   thoughts/feelings acknowledged  Taken 10/8/2023 1000 by Oralia Nichols RN  Trust Relationship/Rapport:   care explained   choices provided   emotional support provided   empathic listening provided   questions answered   questions encouraged   reassurance provided   thoughts/feelings acknowledged  Taken 10/8/2023 0830 by Oralia Nichols, RN  Trust Relationship/Rapport:   care explained   choices provided   emotional support provided   empathic listening provided   questions answered   questions encouraged   reassurance provided   thoughts/feelings acknowledged  Taken 10/8/2023 0800 by Oralia Nichols, RN  Trust Relationship/Rapport:   care explained   choices provided   emotional support provided   empathic listening provided   questions answered   questions encouraged   reassurance provided   thoughts/feelings acknowledged     Problem: Fall Injury Risk  Goal: Absence of Fall and Fall-Related Injury  Outcome: Ongoing, Progressing  Intervention: Identify and Manage Contributors  Recent Flowsheet Documentation  Taken 10/8/2023 1700 by Oralia Nichols, RN  Medication Review/Management: medications  reviewed  Taken 10/8/2023 1400 by Oralia Nichols RN  Medication Review/Management: medications reviewed  Taken 10/8/2023 1200 by Oralia Nichols RN  Medication Review/Management: medications reviewed  Taken 10/8/2023 1000 by Oralia Nichols RN  Medication Review/Management: medications reviewed  Taken 10/8/2023 0800 by Oralia Nichols RN  Medication Review/Management: medications reviewed  Intervention: Promote Injury-Free Environment  Recent Flowsheet Documentation  Taken 10/8/2023 1700 by Oralia Nichols RN  Safety Promotion/Fall Prevention:   activity supervised   assistive device/personal items within reach   clutter free environment maintained   nonskid shoes/slippers when out of bed   safety round/check completed  Taken 10/8/2023 1400 by Oralia Nichols RN  Safety Promotion/Fall Prevention:   activity supervised   assistive device/personal items within reach   clutter free environment maintained   nonskid shoes/slippers when out of bed   safety round/check completed  Taken 10/8/2023 1200 by Oralia Nichols RN  Safety Promotion/Fall Prevention:   activity supervised   assistive device/personal items within reach   clutter free environment maintained   nonskid shoes/slippers when out of bed   safety round/check completed  Taken 10/8/2023 1000 by Oralia Nichols RN  Safety Promotion/Fall Prevention:   activity supervised   assistive device/personal items within reach   clutter free environment maintained   nonskid shoes/slippers when out of bed   safety round/check completed  Taken 10/8/2023 0800 by Oralia Nichols RN  Safety Promotion/Fall Prevention:   activity supervised   assistive device/personal items within reach   clutter free environment maintained   nonskid shoes/slippers when out of bed   safety round/check completed     Problem: Skin Injury Risk Increased  Goal: Skin Health and Integrity  Outcome: Ongoing, Progressing  Intervention: Optimize Skin Protection  Recent Flowsheet Documentation  Taken  10/8/2023 1700 by Oralia Nichols RN  Pressure Reduction Techniques:   frequent weight shift encouraged   weight shift assistance provided  Head of Bed ([facility]) Positioning: [facility] elevated  Pressure Reduction Devices:   positioning supports utilized   pressure-redistributing mattress utilized  Skin Protection:   tubing/devices free from skin contact   adhesive use limited   drying agents applied   incontinence pads utilized   pectin skin barriers applied   protective footwear used   skin sealant/moisture barrier applied  Taken 10/8/2023 1400 by Oralia Nichols RN  Pressure Reduction Techniques:   weight shift assistance provided   frequent weight shift encouraged  Head of Bed ([facility]) Positioning: [facility] elevated  Pressure Reduction Devices: pressure-redistributing mattress utilized  Skin Protection: tubing/devices free from skin contact  Taken 10/8/2023 1300 by Oralia Nichols RN  Pressure Reduction Techniques:   frequent weight shift encouraged   weight shift assistance provided  Pressure Reduction Devices: pressure-redistributing mattress utilized  Skin Protection: tubing/devices free from skin contact  Taken 10/8/2023 1200 by Oralia Nichols RN  Pressure Reduction Techniques:   frequent weight shift encouraged   weight shift assistance provided  Head of Bed ([facility]) Positioning: [facility] elevated  Pressure Reduction Devices: pressure-redistributing mattress utilized  Skin Protection: tubing/devices free from skin contact  Taken 10/8/2023 1000 by Oralia Nichols RN  Head of Bed ([facility]) Positioning: [facility] elevated  Pressure Reduction Devices:   pressure-redistributing mattress utilized   specialty bed utilized  Taken 10/8/2023 0800 by Oralia Nichols RN  Head of Bed ([facility]) Positioning: [facility] elevated     Problem: Palliative Care  Goal: Enhanced Quality of Life  Outcome: Ongoing, Progressing  Intervention: Maximize Comfort  Recent Flowsheet Documentation  Taken 10/8/2023 0800 by Oralia Nichols RN  Oral Care: lip/mouth moisturizer  applied  Intervention: Optimize Psychosocial Wellbeing  Recent Flowsheet Documentation  Taken 10/8/2023 1700 by Oralia Nichols RN  Supportive Measures:   active listening utilized   verbalization of feelings encouraged  Family/Support System Care:   self-care encouraged   support provided  Taken 10/8/2023 1400 by Oralia Nichols RN  Supportive Measures:   active listening utilized   self-responsibility promoted   verbalization of feelings encouraged  Family/Support System Care:   presence promoted   support provided  Taken 10/8/2023 1200 by Oralia Nichols RN  Family/Support System Care:   self-care encouraged   support provided  Taken 10/8/2023 1000 by Oralia Nicohls RN  Family/Support System Care:   self-care encouraged   support provided  Taken 10/8/2023 0830 by Oralia Nichols RN  Supportive Measures:   active listening utilized   verbalization of feelings encouraged  Family/Support System Care:   self-care encouraged   support provided  Taken 10/8/2023 0800 by Oralia Nichols RN  Supportive Measures:   active listening utilized   verbalization of feelings encouraged  Family/Support System Care: support provided   Goal Outcome Evaluation:   Client admitted on 10/03/2023 with acute respiratory failure with hypoxic episode. Client had several episodes with pain requiring the administration of medications for comfort. Vital signs noted low, administrated midodrine as prescribed to keep blood pressure stable and within limits. Client remained stable throughout this shift.                      yes

## 2023-12-14 NOTE — PROCEDURE NOTE - NSICDXPROBLEMMLMBOX_GEN
IPSTART~^~1~^~[id number]~^~~^~IPEND  PKSTART~^~[id number]~^~PKEND   IPSTART~^~1~^~[id number]~^~~^~IPEND  PKSTART~^~[id number]~^~PKEND

## 2023-12-14 NOTE — ASU DISCHARGE PLAN (ADULT/PEDIATRIC) - CARE PROVIDER_API CALL
Juan Luis Rader.  Pain Medicine  [address] [place] [address], [facility] [address]  [place] [address]  Phone: [phone]  Fax: [phone]  Follow Up Time:    Juan Luis Rader.  Pain Medicine  [address] [place] [address], [facility] [address]  [place] [address]  Phone: [phone]  Fax: [phone]  Follow Up Time:

## 2023-12-14 NOTE — PROCEDURE NOTE - ADDITIONAL PROCEDURE DETAILS
After written, informed consent was obtained, the patient was brought to the OR where he positioned himself supine on the OR table with his left knee supported by a bolster.  Monitors were applied and sedation begun by the anesthesia team.  The site was prepped and draped in the usual sterile fashion and time out was completed.    With the aid of fluoroscopy, the left superomedial, left superolateral, and left inferomedial genicular nerve locations were identified.  Each of the sites was anesthetized superficially with several cc of 1% lidocaine.  At each site, a 100 mm 20 gauge needle was advanced to the level of the bone at its border.  At each site, fluoroscopic confirmation of correct needle position was done in both AP and lateral projections.  Radiofrequency treatment was then carried out at 60 degrees Celsius for 90 seconds.  Each probe was removed and after negative aspiration, 2 cc of a 6 cc mixture of 40 mg Depo-medrol and 0.5% bupivacaine was instilled at each site.  The needles were removed and hemostasis confirmed.    The field was cleaned and dried and a Band-Aid dressing applied at each site.   The patient tolerated the procedure well and was transported to the PACU in stable condition.  He will follow up in clinic in 2 weeks' time.

## 2023-12-14 NOTE — ASU DISCHARGE PLAN (ADULT/PEDIATRIC) - NURSING INSTRUCTIONS
DO NOT take any Tylenol (Acetaminophen) or narcotics containing Tylenol until after 3:15pm . You received Tylenol during your operation and it can cause damage to your liver if too much is taken within a 24 hour time period.

## 2023-12-14 NOTE — ASU DISCHARGE PLAN (ADULT/PEDIATRIC) - NS MD DC FALL RISK RISK
For information on Fall & Injury Prevention, visit: [email or web address] OR  [email or web address] OR  https://www.cdc.gov/steadi/patient.html For information on Fall & Injury Prevention, visit: [email or web address]/[email or web address]-[email or web address] OR  [email or web address]://[email or web address]/[email or web address]-[email or web address]-injury OR  https://www.cdc.gov/steadi/patient.html

## 2023-12-16 PROBLEM — M19.90 UNSPECIFIED OSTEOARTHRITIS, UNSPECIFIED SITE: Chronic | Status: ACTIVE | Noted: 2023-12-12

## 2024-01-09 ENCOUNTER — APPOINTMENT (OUTPATIENT)
Dept: PAIN MANAGEMENT | Facility: CLINIC | Age: 69
End: 2024-01-09

## 2024-01-20 NOTE — PHYSICAL THERAPY INITIAL EVALUATION ADULT - PERTINENT HX OF CURRENT PROBLEM, REHAB EVAL
Pt requesting to call mom, pt provided with portable phone at this time     Sayra Bello RN  01/19/24 3499     62 y.o. M. with h/o HTN and failed Right TKR Revision don on 9/2015 by Dr. Mayer. Patient has pain, instabilty and swelling that are getting worse and is presenting at [facility] for his another Right TKR Revision with Dr. Davila.

## 2024-02-29 ENCOUNTER — APPOINTMENT (OUTPATIENT)
Dept: PAIN MANAGEMENT | Facility: CLINIC | Age: 69
End: 2024-02-29

## 2024-03-03 NOTE — PATIENT PROFILE ADULT. - NS PRO PT RIGHT SUPPORT PERSON
Yes Declines

## 2024-06-22 NOTE — DISCHARGE NOTE ADULT - CAREGIVER RELATION TO PATIENT
V2.0    [facility] Progress Note      Name:  Marcela Colby /Age/Sex: 1957  (67 y.o. female)   MRN & CSN:  [id number] & [id number] Encounter Date/Time: 2024 9:13 AM EDT   Location:  [facility]4/4514-01 PCP: Thalia Au MD     Attending:Elena Broussard MD       Hospital Day: 2    Assessment and Recommendations   Marcela Colby is a 67 y.o. female with pmh of CKD stage V, hyperlipidemia, hypertension, dementia, pancytopenia, hypothyroidism who presents with Cardiac arrest (HCC).  Patient was sent for right lower leg pain and swelling.  Had a Doppler which was negative.  Patient came back to the ED with dyspnea shortness of breath.  Patient was placed on BiPAP and was febrile.  Patient then went into PEA cardiac arrest      Plan:     Cardiac arrest  -30 minutes rocs   -PEA cardiac arrest  -Cause of arrest unclear  -With left leg swelling and cardiac arrest concern for PE.  Discussed with pulmonology.  Possible heparinization  -Other differential considered is volume overload.  May need Lasix  -EKG sinus tach  -Concern for anoxic brain injury.  Patient has nonpurposeful movements.  Had some myoclonus.  Neurology consult    Shock  -Unclear if cardiogenic or septic  -On  pressors  -Wean pressors as tolerated    Sepsis  -With fever tachycardia  -Viral panel pending  -Pneumonia panel pending  -Continue empiric antibiotics    Volume overload-anasarca  -Patient's weight at baseline is 308 pound  -No recent echo, last echo in  showed a normal EF  -Blood pressure running very low on  pressors.  Unable to relate  -Left greater than right swelling with blisters and oozing    Acute hypoxic respiratory failure  -Possibly due to volume overload with pneumonia and possible PE  -Vent management per pulmonology      LUIS with CKD stage V-now oliguric  -Baseline creatinine 3.5 to 4  -Per nephrology note it appears that patient did not want dialysis and if this got worse he would have transition to  hospice    Hypothyroidism  -On levothyroxine at home     Anemia  -Hemoglobin 9 .4      Hypertension  -Patient takes labetalol 100 mg twice a day, amlodipine 10 mg daily and torsemide daily.  At present of medications    -History empyema of lung -of thoracoscopic exploration with partial lung decortication and pleural washout-7/20/2021.  At that time was admitted for septic shock due to empyema from strep intermedius    -History of uterine cancer s/p HAILY/BSO    Diet Diet NPO   DVT Prophylaxis [] Lovenox, []  Heparin, [] SCDs, [] Ambulation,  [] Eliquis, [] Xarelto  [] Coumadin   Code Status Full Code   Disposition From: [facility]  Expected Disposition: SNF  Estimated Date of Discharge: unclear   Patient requires continued admission due to cardiac arrest    Surrogate Decision Maker/ POA       Personally reviewed Lab Studies and Imaging       Drugs that require monitoring for toxicity include IV pressors Vanco and the method of monitoring was vitals, Vanco level        Subjective:     Chief Complaint: Cardiac arrest    Marcela Colby is a 67 y.o. female who presents with cardiac arrest  Patient is on minimal dose of propofol.  On pressors.  Urine output from  Moving her extremities with no purposeful movement    Review of Systems:      Pertinent positives and negatives discussed in HPI    Objective:     Intake/Output Summary (Last 24 hours) at 6/22/2024 0913  Last data filed at 6/22/2024 0800  Gross per 24 hour   Intake 3249.7 ml   Output 100 ml   Net 3149.7 ml      Vitals:   Vitals:    06/22/24 0630 06/22/24 0800 06/22/24 0805 06/22/24 0842   BP:    101/62   Pulse: 96  93    Resp: 24  23    Temp:  (!) 101.8 °F (38.8 °C)     TempSrc:  Bladder     SpO2: 96%  96%    Weight:    (!) 141.5 kg (312 lb)   Height:    1.6 m (5' 3\")         Physical Exam:      General: Morbidly  Eyes: EOMI  ENT: neck supple  Cardiovascular: Regular rate.  Respiratory: Few basal rhonchi  Gastrointestinal: Soft, non tender  Genitourinary: no suprapubic  spouse

## 2024-06-28 ENCOUNTER — APPOINTMENT (OUTPATIENT)
Dept: ORTHOPEDIC SURGERY | Facility: CLINIC | Age: 69
End: 2024-06-28

## 2024-07-15 ENCOUNTER — APPOINTMENT (OUTPATIENT)
Dept: ORTHOPEDIC SURGERY | Facility: CLINIC | Age: 69
End: 2024-07-15
Payer: COMMERCIAL

## 2024-07-15 VITALS — HEIGHT: 71 IN | BODY MASS INDEX: 30.52 KG/M2 | WEIGHT: 218 LBS

## 2024-07-15 DIAGNOSIS — Z96.651 PRESENCE OF RIGHT ARTIFICIAL KNEE JOINT: ICD-10-CM

## 2024-07-15 DIAGNOSIS — M17.0 BILATERAL PRIMARY OSTEOARTHRITIS OF KNEE: ICD-10-CM

## 2024-07-15 DIAGNOSIS — Z96.653 PRESENCE OF ARTIFICIAL KNEE JOINT, BILATERAL: ICD-10-CM

## 2024-07-15 DIAGNOSIS — Z96.652 PRESENCE OF LEFT ARTIFICIAL KNEE JOINT: ICD-10-CM

## 2024-07-15 PROCEDURE — 73562 X-RAY EXAM OF KNEE 3: CPT | Mod: RT

## 2024-07-15 PROCEDURE — 99213 OFFICE O/P EST LOW 20 MIN: CPT

## 2024-07-15 PROCEDURE — G2211 COMPLEX E/M VISIT ADD ON: CPT | Mod: NC

## 2024-09-03 ENCOUNTER — APPOINTMENT (OUTPATIENT)
Dept: ORTHOPEDIC SURGERY | Facility: CLINIC | Age: 69
End: 2024-09-03
Payer: COMMERCIAL

## 2024-09-03 DIAGNOSIS — M75.42 IMPINGEMENT SYNDROME OF LEFT SHOULDER: ICD-10-CM

## 2024-09-03 DIAGNOSIS — Z96.611 PRESENCE OF RIGHT ARTIFICIAL SHOULDER JOINT: ICD-10-CM

## 2024-09-03 PROCEDURE — 73030 X-RAY EXAM OF SHOULDER: CPT | Mod: LT

## 2024-09-03 PROCEDURE — 99212 OFFICE O/P EST SF 10 MIN: CPT | Mod: 25

## 2024-09-03 PROCEDURE — 20610 DRAIN/INJ JOINT/BURSA W/O US: CPT | Mod: LT

## 2024-11-04 NOTE — DISCHARGE NOTE ADULT - INSTRUCTIONS
ED Disposition       None          Assessment & Plan   {Hyperlinks  Risk Stratification - NIHSS - HEART SCORE - Fill out sepsis note and make sure you call [phone] if severe or septic shock:[id number]}    Medical Decision Making  Amount and/or Complexity of Data Reviewed  Labs: ordered. Decision-making details documented in ED Course.  Radiology: ordered and independent interpretation performed.    Risk  Prescription drug management.        ED Course as of 11/04/24 1954   Mon Nov 04, 2024   1750 ANION GAP(!): 14   1800 hs TnI 0hr: 46  Near baseline   1831 Creatinine: 1.27   1925 Delta 2hr hsTnI: 0       Medications - No data to display    ED Risk Strat Scores                                               History of Present Illness   {Hyperlinks  History (Med, Surg, Fam, Social) - Current Medications - Allergies  :[id number]}    Chief Complaint   Patient presents with    Chest Pain     Pt. C/o mid sternal chest pain for the last two days.     Alcohol Intoxication       Past Medical History:   Diagnosis Date    Acute on chronic kidney failure  (HCC)     Alcohol withdrawal (HCC) 06/07/2019    Atrial fibrillation (HCC)     Cancer (HCC)     prostate ca,had radiation    Cardiac disease     stents,then triple bypass    COPD (chronic obstructive pulmonary disease) (HCC)     Coronary artery disease     ETOH abuse     Heart failure (HCC)     History of heart surgery     says [facility] bypass [facility]    Hx of heart artery stent     2014    Hyperlipidemia     Hypertension     Hyponatremia 10/17/2019    Hypovolemic shock (HCC) 12/22/2019    Lumbar spondylitis (HCC) 10/13/2022    Metabolic acidosis, increased anion gap 04/21/2021    Nasal bone fracture 10/10/2022    Prostate CA (HCC)     S/P CABG x 3     2004    Sleep apnea       Past Surgical History:   Procedure Laterality Date    CARDIAC CATHETERIZATION      2 stents    CORONARY ARTERY BYPASS GRAFT  2004    [place] ARTHRD ANT INTERBODY MIN DSC CRV BELOW C2 N/A 12/16/2020     Procedure: Anterior cervical discectomy with fusion C4-C7; Posterior cervical decompression and fusion C2-T2;  Surgeon: David Rowell MD;  Location: [facility];  Service: Neurosurgery    TONSILLECTOMY        Family History   Problem Relation Age of Onset    Diabetes Mother     Uterine cancer Mother     COPD Father     Hypertension Father       Social History     Tobacco Use    Smoking status: Every Day     Current packs/day: 1.50     Average packs/day: 1.5 packs/day for 40.0 years (60.0 ttl pk-yrs)     Types: Cigarettes    Smokeless tobacco: Never   Vaping Use    Vaping status: Never Used   Substance Use Topics    Alcohol use: Yes     Alcohol/week: 4.0 standard drinks of alcohol     Types: 4 Standard drinks or equivalent per week     Comment: quart of vodka daily    Drug use: No      E-Cigarette/Vaping    E-Cigarette Use Never User       E-Cigarette/Vaping Substances    Nicotine Yes     THC No     CBD No     Flavoring No     Other No     Unknown No       I have reviewed and agree with the history as documented.     63-year-old male with history of ethanol abuse, A-fib, CAD status post stenting and triple bypass, COPD, hypertension, hyperlipidemia, presents emergency department due to diffuse pain. Notes that he has had back pain that has been bothering him for about 2 days. When asked if it was related to his fall he notes that he always falls and always ends up with these kinds of pains. He is concerned it is his kidney but otherwise cannot elaborate. He does endorse chest pain but cannot localize it. Denies other associated symptoms, recurrent falls, or other concerns.         Review of Systems   All other systems reviewed and are negative.          Objective   {Hyperlinks  Historical Vitals - Historical Labs - Chart Review/Microbiology - Last Echo - Code Status  :[id number]}    ED Triage Vitals [11/04/24 1630]   Temperature Pulse Blood Pressure Respirations SpO2 Patient Position - Orthostatic VS   98.1 °F (36.7  °C) 95 123/58 20 94 % Lying      Temp Source Heart Rate Source BP Location FiO2 (%) Pain Score    Oral Monitor Left arm -- --      Vitals      Date and Time Temp Pulse SpO2 Resp BP Pain Score FACES Pain Rating User   11/04/24 1630 98.1 °F (36.7 °C) 95 94 % 20 123/58 -- -- CS            Physical Exam  Vitals and nursing note reviewed.   Constitutional:       General: He is not in acute distress.     Appearance: He is well-developed.      Comments: intoxicated   HENT:      Head: Normocephalic and atraumatic.   Eyes:      Conjunctiva/sclera: Conjunctivae normal.   Cardiovascular:      Rate and Rhythm: Normal rate and regular rhythm.      Heart sounds: No murmur heard.  Pulmonary:      Effort: Pulmonary effort is normal. No respiratory distress.      Breath sounds: Normal breath sounds.   Abdominal:      Palpations: Abdomen is soft.      Tenderness: There is abdominal tenderness (generalized).   Musculoskeletal:         General: Tenderness (generalized.) present. No swelling.      Cervical back: Neck supple.   Skin:     General: Skin is warm and dry.      Capillary Refill: Capillary refill takes less than 2 seconds.      Comments: Bruising to right ribs   Neurological:      Mental Status: He is alert.   Psychiatric:         Mood and Affect: Mood normal.         Results Reviewed       None            No orders to display       ECG 12 Lead Documentation Only    Date/Time: 11/4/2024 6:56 PM    Performed by: Tirso Gregory MD  Authorized by: Tirso Gregory MD    ECG reviewed by me, the ED Provider: yes    Patient location:  ED  Previous ECG:     Previous ECG:  Compared to current    Similarity:  Changes noted  Interpretation:     Interpretation: abnormal    Rate:     ECG rate assessment: tachycardic    Rhythm:     Rhythm: atrial fibrillation    Ectopy:     Ectopy: none    QRS:     QRS axis:  Normal    QRS intervals:  Normal  Conduction:     Conduction: normal    ST segments:     ST segments:  Normal  T waves:     T  waves: normal        ED Medication and Procedure Management   Prior to Admission Medications   Prescriptions Last Dose Informant Patient Reported? Taking?   Blood Glucose Monitoring Suppl (ONE TOUCH ULTRA MINI) w/Device KIT  Self Yes No   Sig: Use as directed   Patient not taking: Reported on 3/25/2024   Blood Pressure Monitoring (Adult Blood Pressure Cuff Lg) KIT   No No   Sig: Use in the morning   Patient not taking: Reported on 3/25/2024   Breo Ellipta 200-25 MCG/ACT inhaler  Self No No   Sig: Inhale 1 puff daily Rinse mouth after use.   ONETOUCH DELICA LANCETS FINE MISC  Self Yes No   Sig: 3 (three) times a day Test   Thiamine HCl (vitamin B-1) 100 MG TABS  Self No No   Sig: TAKE 1 TABLET DAILY   albuterol (Ventolin HFA) 90 mcg/act inhaler  Self No No   Sig: Inhale 2 puffs every 4 (four) hours as needed for wheezing   aluminum-magnesium hydroxide-simethicone (MAALOX) 5562-9782-923 mg/30 mL suspension   No No   Sig: Take 30 mL by mouth every 4 (four) hours as needed for indigestion or heartburn   apixaban (Eliquis) 5 mg   No No   Sig: TAKE 1 TABLET BY MOUTH 2 TIMES A DAY DO NOT START BEFORE JANUARY 31, 2024.   aspirin (ECOTRIN LOW STRENGTH) 81 mg EC tablet  Self Yes No   Sig: Take 81 mg by mouth daily   ferrous sulfate 325 (65 Fe) mg tablet  Self No No   Sig: Take 1 tablet (325 mg total) by mouth daily with breakfast   folic acid (FOLVITE) 1 mg tablet   No No   Sig: TAKE 1 TABLET DAILY   gabapentin (NEURONTIN) 300 mg capsule   No No   Sig: TAKE ONE CAPSULE THREE TIMES DAILY   magnesium Oxide (MAG-OX) 400 mg TABS   No No   Sig: Take 1 tablet (400 mg total) by mouth 2 (two) times a day   metFORMIN (GLUCOPHAGE) 500 mg tablet   No No   Sig: TAKE 1 TABLET DAILY WITH BREAKFAST   metoprolol tartrate (LOPRESSOR) 50 mg tablet   No No   Sig: TAKE 1 TABLET BY MOUTH EVERY 12 HOURS   naltrexone (REVIA) 50 mg tablet   No No   Sig: Take 1 tablet (50 mg total) by mouth daily   nicotine (NICODERM CQ) 21 mg/24 hr TD 24 hr patch   Self No No   Sig: Place 1 patch on the skin over 24 hours daily Do not start before December 4, 2023.   pantoprazole (PROTONIX) 40 mg tablet   No No   Sig: TAKE 1 TABLET TWO TIMES A DAY   ranolazine (RANEXA) 500 mg 12 hr tablet   No No   Sig: TAKE 1 TABLET EVERY 12 HOURS   senna-docusate sodium (SENOKOT S) 8.6-50 mg per tablet   No No   Sig: Take 1 tablet by mouth daily as needed for constipation   tamsulosin (FLOMAX) 0.4 mg   No No   Sig: TAKE 1 CAPSULE DAILY      Facility-Administered Medications: None     Patient's Medications   Discharge Prescriptions    No medications on file     No discharge procedures on file.  ED SEPSIS DOCUMENTATION          is accurate only with a steady state creatinine    CBC and differential [[id number]]  (Abnormal) Collected: 11/04/24 1657    Lab Status: Final result Specimen: Blood from Arm, Right Updated: 11/04/24 1724     WBC 6.54 Thousand/uL      RBC 4.13 Million/uL      Hemoglobin 13.4 g/dL      Hematocrit 39.1 %      MCV 95 fL      MCH 32.4 pg      MCHC 34.3 g/dL      RDW 15.8 %      MPV 10.3 fL      Platelets 101 Thousands/uL      nRBC 0 /100 WBCs      Segmented % 66 %      Immature Grans % 0 %      Lymphocytes % 22 %      Monocytes % 11 %      Eosinophils Relative 0 %      Basophils Relative 1 %      Absolute Neutrophils 4.25 Thousands/µL      Absolute Immature Grans 0.02 Thousand/uL      Absolute Lymphocytes 1.44 Thousands/µL      Absolute Monocytes 0.73 Thousand/µL      Eosinophils Absolute 0.01 Thousand/µL      Basophils Absolute 0.09 Thousands/µL             XR chest 1 view portable   ED Interpretation by Tirso Gregory MD (11/04 1837)   No acute cardiopulmonary disease      Final Interpretation by Angel Smart MD (11/04 2147)      No acute cardiopulmonary disease.            Workstation performed: [id number]             ECG 12 Lead Documentation Only    Date/Time: 11/4/2024 6:56 PM    Performed by: Tirso Gregory MD  Authorized by: Tirso Gregory MD    ECG reviewed by me, the ED Provider: yes    Patient location:  ED  Previous ECG:     Previous ECG:  Compared to current    Similarity:  Changes noted  Interpretation:     Interpretation: abnormal    Rate:     ECG rate assessment: tachycardic    Rhythm:     Rhythm: atrial fibrillation    Ectopy:     Ectopy: none    QRS:     QRS axis:  Normal    QRS intervals:  Normal  Conduction:     Conduction: normal    ST segments:     ST segments:  Normal  T waves:     T waves: non-specific        ED Medication and Procedure Management   Prior to Admission Medications   Prescriptions Last Dose Informant Patient Reported? Taking?   Blood Glucose Monitoring Suppl ONE  TOUCH ULTRA MINI) w/Device KIT  Self Yes No   Sig: Use as directed   Patient not taking: Reported on 3/25/2024   Blood Pressure Monitoring (Adult Blood Pressure Cuff Lg) KIT   No No   Sig: Use in the morning   Patient not taking: Reported on 3/25/2024   Breo Ellipta 200-25 MCG/ACT inhaler  Self No No   Sig: Inhale 1 puff daily Rinse mouth after use.   ONETOUCH DELICA LANCETS FINE MISC  Self Yes No   Sig: 3 (three) times a day Test   Thiamine HCl (vitamin B-1) 100 MG TABS  Self No No   Sig: TAKE 1 TABLET DAILY   albuterol (Ventolin HFA) 90 mcg/act inhaler  Self No No   Sig: Inhale 2 puffs every 4 (four) hours as needed for wheezing   aluminum-magnesium hydroxide-simethicone (MAALOX) 0765-5559-205 mg/30 mL suspension   No No   Sig: Take 30 mL by mouth every 4 (four) hours as needed for indigestion or heartburn   apixaban (Eliquis) 5 mg   No No   Sig: TAKE 1 TABLET BY MOUTH 2 TIMES A DAY DO NOT START BEFORE JANUARY 31, 2024.   aspirin (ECOTRIN LOW STRENGTH) 81 mg EC tablet  Self Yes No   Sig: Take 81 mg by mouth daily   ferrous sulfate 325 (65 Fe) mg tablet  Self No No   Sig: Take 1 tablet (325 mg total) by mouth daily with breakfast   folic acid (FOLVITE) 1 mg tablet   No No   Sig: TAKE 1 TABLET DAILY   gabapentin (NEURONTIN) 300 mg capsule   No No   Sig: TAKE ONE CAPSULE THREE TIMES DAILY   magnesium Oxide (MAG-OX) 400 mg TABS   No No   Sig: Take 1 tablet (400 mg total) by mouth 2 (two) times a day   metFORMIN (GLUCOPHAGE) 500 mg tablet   No No   Sig: TAKE 1 TABLET DAILY WITH BREAKFAST   metoprolol tartrate (LOPRESSOR) 50 mg tablet   No No   Sig: TAKE 1 TABLET BY MOUTH EVERY 12 HOURS   naltrexone (REVIA) 50 mg tablet   No No   Sig: Take 1 tablet (50 mg total) by mouth daily   nicotine (NICODERM CQ) 21 mg/24 hr TD 24 hr patch  Self No No   Sig: Place 1 patch on the skin over 24 hours daily Do not start before December 4, 2023.   pantoprazole (PROTONIX) 40 mg tablet   No No   Sig: TAKE 1 TABLET TWO TIMES A DAY    ranolazine (RANEXA) 500 mg 12 hr tablet   No No   Sig: TAKE 1 TABLET EVERY 12 HOURS   senna-docusate sodium (SENOKOT S) 8.6-50 mg per tablet   No No   Sig: Take 1 tablet by mouth daily as needed for constipation   tamsulosin (FLOMAX) 0.4 mg   No No   Sig: TAKE 1 CAPSULE DAILY      Facility-Administered Medications: None     Discharge Medication List as of 11/4/2024  7:59 PM        START taking these medications    Details   lidocaine (Lidoderm) 5 % Apply 1 patch topically over 12 hours daily Remove & Discard patch within 12 hours or as directed by MD, Starting Mon 11/4/2024, Normal      naproxen (Naprosyn) 500 mg tablet Take 1 tablet (500 mg total) by mouth 2 (two) times a day with meals, Starting Mon 11/4/2024, Normal           CONTINUE these medications which have NOT CHANGED    Details   albuterol (Ventolin HFA) 90 mcg/act inhaler Inhale 2 puffs every 4 (four) hours as needed for wheezing, Starting Tue 1/17/2023, Normal      aluminum-magnesium hydroxide-simethicone (MAALOX) 0722-5775-415 mg/30 mL suspension Take 30 mL by mouth every 4 (four) hours as needed for indigestion or heartburn, Starting Wed 2/14/2024, Normal      apixaban (Eliquis) 5 mg TAKE 1 TABLET BY MOUTH 2 TIMES A DAY DO NOT START BEFORE JANUARY 31, 2024., Normal      aspirin (ECOTRIN LOW STRENGTH) 81 mg EC tablet Take 81 mg by mouth daily, Historical Med      Blood Glucose Monitoring Suppl (ONE TOUCH ULTRA MINI) w/Device KIT Use as directed, Starting Thu 5/16/2019, Historical Med      Blood Pressure Monitoring (Adult Blood Pressure Cuff Lg) KIT Use in the morning, Starting Thu 12/14/2023, Normal      Breo Ellipta 200-25 MCG/ACT inhaler Inhale 1 puff daily Rinse mouth after use., Starting Fri 6/9/2023, Normal      ferrous sulfate 325 (65 Fe) mg tablet Take 1 tablet (325 mg total) by mouth daily with breakfast, Starting Fri 6/2/2023, Normal      folic acid (FOLVITE) 1 mg tablet TAKE 1 TABLET DAILY, Normal      gabapentin (NEURONTIN) 300 mg capsule  TAKE ONE CAPSULE THREE TIMES DAILY, Normal      magnesium Oxide (MAG-OX) 400 mg TABS Take 1 tablet (400 mg total) by mouth 2 (two) times a day, Starting Fri 9/20/2024, Normal      metFORMIN (GLUCOPHAGE) 500 mg tablet TAKE 1 TABLET DAILY WITH BREAKFAST, Starting Sun 4/7/2024, Normal      metoprolol tartrate (LOPRESSOR) 50 mg tablet TAKE 1 TABLET BY MOUTH EVERY 12 HOURS, Starting Wed 9/18/2024, Normal      naltrexone (REVIA) 50 mg tablet Take 1 tablet (50 mg total) by mouth daily, Starting Thu 9/19/2024, Normal      nicotine (NICODERM CQ) 21 mg/24 hr TD 24 hr patch Place 1 patch on the skin over 24 hours daily Do not start before December 4, 2023., Starting Mon 12/4/2023, Normal      ONETOUCH DELICA LANCETS FINE MISC 3 (three) times a day Test, Starting Thu 5/16/2019, Historical Med      pantoprazole (PROTONIX) 40 mg tablet TAKE 1 TABLET TWO TIMES A DAY, Normal      ranolazine (RANEXA) 500 mg 12 hr tablet TAKE 1 TABLET EVERY 12 HOURS, Normal      senna-docusate sodium (SENOKOT S) 8.6-50 mg per tablet Take 1 tablet by mouth daily as needed for constipation, Starting Sat 1/27/2024, No Print      tamsulosin (FLOMAX) 0.4 mg TAKE 1 CAPSULE DAILY, Normal      Thiamine HCl (vitamin B-1) 100 MG TABS TAKE 1 TABLET DAILY, Normal           No discharge procedures on file.  ED SEPSIS DOCUMENTATION   Time reflects when diagnosis was documented in both MDM as applicable and the Disposition within this note       Time User Action Codes Description Comment    11/4/2024  7:55 PM Tirso Gregory [M54.9] Back pain     11/4/2024  7:59 PM Tirso Gregory [T14.8XXA] Contusion                  Tirso Gregory MD  11/11/24 9165     Regular diet

## 2024-11-08 ENCOUNTER — NON-APPOINTMENT (OUTPATIENT)
Age: 69
End: 2024-11-08

## 2024-11-17 ENCOUNTER — RESULT REVIEW (OUTPATIENT)
Age: 69
End: 2024-11-17

## 2024-12-03 ENCOUNTER — APPOINTMENT (OUTPATIENT)
Dept: ORTHOPEDIC SURGERY | Facility: CLINIC | Age: 69
End: 2024-12-03
Payer: COMMERCIAL

## 2024-12-03 VITALS — WEIGHT: 219 LBS | HEIGHT: 71 IN | BODY MASS INDEX: 30.66 KG/M2

## 2024-12-03 DIAGNOSIS — M75.122 COMPLETE ROTATOR CUFF TEAR OR RUPTURE OF LEFT SHOULDER, NOT SPECIFIED AS TRAUMATIC: ICD-10-CM

## 2024-12-03 DIAGNOSIS — M75.42 IMPINGEMENT SYNDROME OF LEFT SHOULDER: ICD-10-CM

## 2024-12-03 PROCEDURE — 99212 OFFICE O/P EST SF 10 MIN: CPT

## 2024-12-05 PROBLEM — M75.122 NONTRAUMATIC COMPLETE TEAR OF LEFT ROTATOR CUFF: Status: ACTIVE | Noted: 2024-12-05

## 2024-12-25 NOTE — PATIENT PROFILE ADULT. - NS PRO ABUSE SCREEN SUSPICION NEGLECT YN
no
Cranial Nerves II - XII: II: PERRLA; visual fields are full to confrontation III, IV, VI: EOMI, no nystagmus appreciated V: facial sensation intact to light touch V1-V3 b/l VII: no ptosis, no facial droop, symmetric eyebrow raise and closure VIII: hearing intact to finger rub b/l  XI: head turning and shoulder shrug intact b/l XII: tongue protrusion midline, Visual Quadrant test WNL, Visual H test- smooth b/l pursuits

## 2025-01-18 NOTE — BRIEF OPERATIVE NOTE - PRE-OP DX
Loose total knee arthroplasty  12/05/2017  right  Active  Sylvester Gomez Attending Attestation (For Attendings USE Only)...

## 2025-01-21 DIAGNOSIS — M75.122 COMPLETE ROTATOR CUFF TEAR OR RUPTURE OF LEFT SHOULDER, NOT SPECIFIED AS TRAUMATIC: ICD-10-CM

## 2025-01-21 DIAGNOSIS — M75.42 IMPINGEMENT SYNDROME OF LEFT SHOULDER: ICD-10-CM

## 2025-01-21 RX ORDER — ACETAMINOPHEN 500 MG/1
500 TABLET ORAL 4 TIMES DAILY
Qty: 112 | Refills: 0 | Status: ACTIVE | COMMUNITY
Start: 2025-01-21 | End: 1900-01-01

## 2025-01-21 RX ORDER — CELECOXIB 200 MG/1
200 CAPSULE ORAL
Qty: 30 | Refills: 0 | Status: ACTIVE | COMMUNITY
Start: 2025-01-21 | End: 1900-01-01

## 2025-01-22 ENCOUNTER — APPOINTMENT (OUTPATIENT)
Age: 70
End: 2025-01-22
Payer: COMMERCIAL

## 2025-01-22 PROCEDURE — 29827 SHO ARTHRS SRG RT8TR CUF RPR: CPT | Mod: LT

## 2025-01-22 PROCEDURE — 29826 SHO ARTHRS SRG DECOMPRESSION: CPT | Mod: LT

## 2025-01-22 PROCEDURE — 29823 SHO ARTHRS SRG XTNSV DBRDMT: CPT | Mod: LT

## 2025-01-31 ENCOUNTER — APPOINTMENT (OUTPATIENT)
Dept: ORTHOPEDIC SURGERY | Facility: CLINIC | Age: 70
End: 2025-01-31

## 2025-02-01 ENCOUNTER — TRANSCRIPTION ENCOUNTER (OUTPATIENT)
Age: 70
End: 2025-02-01

## 2025-02-04 ENCOUNTER — APPOINTMENT (OUTPATIENT)
Dept: ORTHOPEDIC SURGERY | Facility: CLINIC | Age: 70
End: 2025-02-04
Payer: COMMERCIAL

## 2025-02-04 VITALS — HEIGHT: 71 IN | WEIGHT: 218 LBS | BODY MASS INDEX: 30.52 KG/M2

## 2025-02-04 DIAGNOSIS — M75.42 IMPINGEMENT SYNDROME OF LEFT SHOULDER: ICD-10-CM

## 2025-02-04 DIAGNOSIS — M75.122 COMPLETE ROTATOR CUFF TEAR OR RUPTURE OF LEFT SHOULDER, NOT SPECIFIED AS TRAUMATIC: ICD-10-CM

## 2025-02-04 PROCEDURE — 99024 POSTOP FOLLOW-UP VISIT: CPT

## 2025-03-04 ENCOUNTER — APPOINTMENT (OUTPATIENT)
Dept: ORTHOPEDIC SURGERY | Facility: CLINIC | Age: 70
End: 2025-03-04
Payer: COMMERCIAL

## 2025-03-04 DIAGNOSIS — M75.42 IMPINGEMENT SYNDROME OF LEFT SHOULDER: ICD-10-CM

## 2025-03-04 DIAGNOSIS — M75.122 COMPLETE ROTATOR CUFF TEAR OR RUPTURE OF LEFT SHOULDER, NOT SPECIFIED AS TRAUMATIC: ICD-10-CM

## 2025-03-04 PROCEDURE — 99024 POSTOP FOLLOW-UP VISIT: CPT

## 2025-04-15 ENCOUNTER — APPOINTMENT (OUTPATIENT)
Dept: ORTHOPEDIC SURGERY | Facility: CLINIC | Age: 70
End: 2025-04-15

## 2025-04-15 VITALS — BODY MASS INDEX: 30.24 KG/M2 | WEIGHT: 216 LBS | HEIGHT: 71 IN

## 2025-04-15 DIAGNOSIS — M75.122 COMPLETE ROTATOR CUFF TEAR OR RUPTURE OF LEFT SHOULDER, NOT SPECIFIED AS TRAUMATIC: ICD-10-CM

## 2025-04-15 DIAGNOSIS — M75.42 IMPINGEMENT SYNDROME OF LEFT SHOULDER: ICD-10-CM

## 2025-04-15 PROCEDURE — 99024 POSTOP FOLLOW-UP VISIT: CPT

## 2025-06-16 NOTE — H&P PST ADULT - NS PRO ABUSE SCREEN SUSPICION NEGLECT YN
Following up on message from Meme Ríos MD   Regarding: [facility] being unable to process referral  [facility] is not the vendor for this patient.   Will assist with alternative vendors for her wheelchair cushion. Fax sent to [facility] at this time..      
no

## 2025-07-15 ENCOUNTER — NON-APPOINTMENT (OUTPATIENT)
Age: 70
End: 2025-07-15

## 2025-07-15 ENCOUNTER — APPOINTMENT (OUTPATIENT)
Dept: ORTHOPEDIC SURGERY | Facility: CLINIC | Age: 70
End: 2025-07-15

## 2025-07-16 ENCOUNTER — APPOINTMENT (OUTPATIENT)
Dept: ORTHOPEDIC SURGERY | Facility: CLINIC | Age: 70
End: 2025-07-16
Payer: MEDICARE

## 2025-07-16 VITALS — HEIGHT: 71 IN | WEIGHT: 215 LBS | BODY MASS INDEX: 30.1 KG/M2

## 2025-07-16 PROCEDURE — G2211 COMPLEX E/M VISIT ADD ON: CPT

## 2025-07-16 PROCEDURE — 73562 X-RAY EXAM OF KNEE 3: CPT | Mod: 50

## 2025-07-16 PROCEDURE — 99213 OFFICE O/P EST LOW 20 MIN: CPT

## (undated) DEVICE — DRAPE INSTRUMENT POUCH 6.75" X 11"

## (undated) DEVICE — SYR CATH TIP 2 OZ

## (undated) DEVICE — SOL IRR POUR H2O 500ML

## (undated) DEVICE — SOL IRR POUR NS 0.9% 500ML

## (undated) DEVICE — WOUND IRR IRRISEPT W 0.5 CHG

## (undated) DEVICE — Device

## (undated) DEVICE — SYR LUER LOK 5CC

## (undated) DEVICE — TRAY EPIDURAL CONT 17G PERFIX

## (undated) DEVICE — DRAPE BACK TABLE COVER 44X90"

## (undated) DEVICE — DRSG CURITY GAUZE SPONGE 4 X 4" 12-PLY

## (undated) DEVICE — SUT VICRYL 2-0 27" CT-1 UNDYED

## (undated) DEVICE — SUT NDL MAYO CATGUT 1/2 CIRCLE TAPER POINT 0.050" X 1.248"

## (undated) DEVICE — NDL HYPO SAFE 22G X 1.5" (BLACK)

## (undated) DEVICE — POSITIONER FOAM EGG CRATE ULNAR 2PCS (PINK)

## (undated) DEVICE — DRAPE C-ARM 41X84"

## (undated) DEVICE — SUT PROLENE 3-0 18" PS-1

## (undated) DEVICE — NDL SPINAL 22G X 3.5" (BLACK)

## (undated) DEVICE — GLV 7.5 PROTEXIS (WHITE)

## (undated) DEVICE — GLV 8 PROTEXIS (WHITE)

## (undated) DEVICE — DRAPE U 47X51" LF STERILE

## (undated) DEVICE — NDL HYPO SAFE 18G X 1.5" (PINK)

## (undated) DEVICE — SUT VICRYL 2-0 27" SH UNDYED

## (undated) DEVICE — CONTAINER SPECIMEN 4OZ

## (undated) DEVICE — SYR CONTROL LUER LOK 10CC

## (undated) DEVICE — DRAPE MAYO STAND 30"

## (undated) DEVICE — VENODYNE/SCD SLEEVE CALF MEDIUM

## (undated) DEVICE — SUT FIBERWIRE #2 38" STRAND 1 BLUE 1 WHITE/BLACK

## (undated) DEVICE — DRSG BANDAID 0.75X3"

## (undated) DEVICE — SUT VICRYL 0 36" CT-1 UNDYED

## (undated) DEVICE — DRAPE U POLY BLUE 60X72"

## (undated) DEVICE — DRAPE 3/4 SHEET 52X76"

## (undated) DEVICE — DRAPE TOWEL BLUE 17" X 24"

## (undated) DEVICE — PREP CHLORAPREP HI-LITE ORANGE 26ML

## (undated) DEVICE — DRSG GAUZE MOISTURIZER 0.5 OZ 4X8

## (undated) DEVICE — ZIMMER MIXING BOWL WITH SPATULA

## (undated) DEVICE — DRAPE MAGNETIC INSTRUMENT MEDIUM

## (undated) DEVICE — DRAPE C ARM MINI

## (undated) DEVICE — DRSG COBAN 6"

## (undated) DEVICE — POSITIONER UNIVERSAL HEAD RESTRAINT DISP

## (undated) DEVICE — SUT ETHIBOND EXCEL 2 30" OS-4

## (undated) DEVICE — DRSG STERISTRIPS 0.5 X 4"

## (undated) DEVICE — DRAPE MAYO STAND 23"

## (undated) DEVICE — MARKING PEN W RULER

## (undated) DEVICE — SYR LUER LOK 30CC

## (undated) DEVICE — WARMING BLANKET LOWER ADULT

## (undated) DEVICE — SUT VICRYL 2-0 27" CT-1

## (undated) DEVICE — SUT FIBERWIRE #2 38" STRAND 1 BLUE T-5 TAPER

## (undated) DEVICE — PACK ORTHO SHOULDER OPEN LF

## (undated) DEVICE — SAW BLADE STRYKER SAGITTAL 12.5X73X0.64MM

## (undated) DEVICE — PACK TOTAL OPEN SHOULDER

## (undated) DEVICE — POSITIONING ARTHREX TRIMANO BEACH CHAIR

## (undated) DEVICE — DRSG AQUACEL 3.5 X 6"

## (undated) DEVICE — DRILL BIT EXACTECH ERGO 3.2MM

## (undated) DEVICE — DRSG KERLIX ROLL 4.5"

## (undated) DEVICE — DRSG COMBINE 5X9"